# Patient Record
Sex: FEMALE | Race: ASIAN | NOT HISPANIC OR LATINO | Employment: FULL TIME | URBAN - METROPOLITAN AREA
[De-identification: names, ages, dates, MRNs, and addresses within clinical notes are randomized per-mention and may not be internally consistent; named-entity substitution may affect disease eponyms.]

---

## 2019-10-01 ENCOUNTER — PRENATAL OFFICE VISIT - HEALTHEAST (OUTPATIENT)
Dept: FAMILY MEDICINE | Facility: CLINIC | Age: 29
End: 2019-10-01

## 2019-10-01 DIAGNOSIS — Z32.00 POSSIBLE PREGNANCY: ICD-10-CM

## 2019-10-01 DIAGNOSIS — Z3A.10 10 WEEKS GESTATION OF PREGNANCY: ICD-10-CM

## 2019-10-01 DIAGNOSIS — Z32.01 PREGNANCY TEST POSITIVE: ICD-10-CM

## 2019-10-01 LAB
ALBUMIN UR-MCNC: NEGATIVE MG/DL
AMPHETAMINES UR QL SCN: NORMAL
BARBITURATES UR QL: NORMAL
BASOPHILS # BLD AUTO: 0 THOU/UL (ref 0–0.2)
BASOPHILS NFR BLD AUTO: 0 % (ref 0–2)
BENZODIAZ UR QL: NORMAL
CANNABINOIDS UR QL SCN: NORMAL
COCAINE UR QL: NORMAL
CREAT UR-MCNC: 126.3 MG/DL
EOSINOPHIL # BLD AUTO: 0.1 THOU/UL (ref 0–0.4)
EOSINOPHIL NFR BLD AUTO: 1 % (ref 0–6)
ERYTHROCYTE [DISTWIDTH] IN BLOOD BY AUTOMATED COUNT: 12.8 % (ref 11–14.5)
GLUCOSE UR STRIP-MCNC: NEGATIVE MG/DL
HCG UR QL: POSITIVE
HCT VFR BLD AUTO: 36.6 % (ref 35–47)
HGB BLD-MCNC: 11.7 G/DL (ref 12–16)
HIV 1+2 AB+HIV1 P24 AG SERPL QL IA: NEGATIVE
KETONES UR STRIP-MCNC: NEGATIVE MG/DL
LYMPHOCYTES # BLD AUTO: 2 THOU/UL (ref 0.8–4.4)
LYMPHOCYTES NFR BLD AUTO: 22 % (ref 20–40)
MCH RBC QN AUTO: 29 PG (ref 27–34)
MCHC RBC AUTO-ENTMCNC: 32 G/DL (ref 32–36)
MCV RBC AUTO: 91 FL (ref 80–100)
MONOCYTES # BLD AUTO: 0.5 THOU/UL (ref 0–0.9)
MONOCYTES NFR BLD AUTO: 5 % (ref 2–10)
NEUTROPHILS # BLD AUTO: 6.5 THOU/UL (ref 2–7.7)
NEUTROPHILS NFR BLD AUTO: 71 % (ref 50–70)
OPIATES UR QL SCN: NORMAL
OXYCODONE UR QL: NORMAL
PCP UR QL SCN: NORMAL
PLATELET # BLD AUTO: 248 THOU/UL (ref 140–440)
PMV BLD AUTO: 10.8 FL (ref 8.5–12.5)
RBC # BLD AUTO: 4.04 MILL/UL (ref 3.8–5.4)
WBC: 9.2 THOU/UL (ref 4–11)

## 2019-10-01 ASSESSMENT — MIFFLIN-ST. JEOR: SCORE: 1215.6

## 2019-10-02 LAB
ABO/RH(D): NORMAL
ABORH REPEAT: NORMAL
ANTIBODY SCREEN: NEGATIVE
BACTERIA SPEC CULT: NO GROWTH
COLLECTION METHOD: NORMAL
HBV SURFACE AG SERPL QL IA: NEGATIVE
LEAD BLD-MCNC: NORMAL UG/DL
RUBV IGG SERPL QL IA: POSITIVE
T PALLIDUM AB SER QL: NEGATIVE

## 2019-10-03 LAB — LEAD BLDV-MCNC: <2 UG/DL (ref 0–4.9)

## 2019-10-23 ENCOUNTER — PRENATAL OFFICE VISIT - HEALTHEAST (OUTPATIENT)
Dept: FAMILY MEDICINE | Facility: CLINIC | Age: 29
End: 2019-10-23

## 2019-10-28 ENCOUNTER — COMMUNICATION - HEALTHEAST (OUTPATIENT)
Dept: FAMILY MEDICINE | Facility: CLINIC | Age: 29
End: 2019-10-28

## 2019-11-08 ENCOUNTER — PRENATAL OFFICE VISIT - HEALTHEAST (OUTPATIENT)
Dept: FAMILY MEDICINE | Facility: CLINIC | Age: 29
End: 2019-11-08

## 2019-11-08 DIAGNOSIS — Z34.92 ENCOUNTER FOR SUPERVISION OF NORMAL PREGNANCY IN SECOND TRIMESTER: ICD-10-CM

## 2019-11-08 LAB
ALBUMIN UR-MCNC: NEGATIVE MG/DL
CLUE CELLS: NORMAL
GLUCOSE UR STRIP-MCNC: NEGATIVE MG/DL
KETONES UR STRIP-MCNC: NEGATIVE MG/DL
TRICHOMONAS, WET PREP: NORMAL
YEAST, WET PREP: NORMAL

## 2019-11-08 ASSESSMENT — MIFFLIN-ST. JEOR: SCORE: 1215.6

## 2019-11-11 LAB
C TRACH DNA SPEC QL PROBE+SIG AMP: NEGATIVE
HPV SOURCE: NORMAL
HUMAN PAPILLOMA VIRUS 16 DNA: NEGATIVE
HUMAN PAPILLOMA VIRUS 18 DNA: NEGATIVE
HUMAN PAPILLOMA VIRUS FINAL DIAGNOSIS: NORMAL
HUMAN PAPILLOMA VIRUS OTHER HR: NEGATIVE
N GONORRHOEA DNA SPEC QL NAA+PROBE: NEGATIVE
SPECIMEN DESCRIPTION: NORMAL

## 2019-11-25 ENCOUNTER — COMMUNICATION - HEALTHEAST (OUTPATIENT)
Dept: FAMILY MEDICINE | Facility: CLINIC | Age: 29
End: 2019-11-25

## 2019-12-04 ENCOUNTER — PRENATAL OFFICE VISIT - HEALTHEAST (OUTPATIENT)
Dept: FAMILY MEDICINE | Facility: CLINIC | Age: 29
End: 2019-12-04

## 2019-12-04 ENCOUNTER — COMMUNICATION - HEALTHEAST (OUTPATIENT)
Dept: FAMILY MEDICINE | Facility: CLINIC | Age: 29
End: 2019-12-04

## 2019-12-04 DIAGNOSIS — Z34.92 ENCOUNTER FOR SUPERVISION OF NORMAL PREGNANCY IN SECOND TRIMESTER: ICD-10-CM

## 2019-12-04 DIAGNOSIS — N89.8 VAGINAL DISCHARGE: ICD-10-CM

## 2019-12-04 ASSESSMENT — MIFFLIN-ST. JEOR: SCORE: 1224.22

## 2019-12-13 ENCOUNTER — HOSPITAL ENCOUNTER (OUTPATIENT)
Dept: ULTRASOUND IMAGING | Facility: HOSPITAL | Age: 29
Discharge: HOME | End: 2019-12-13
Attending: FAMILY MEDICINE

## 2019-12-16 ENCOUNTER — COMMUNICATION - HEALTHEAST (OUTPATIENT)
Dept: FAMILY MEDICINE | Facility: CLINIC | Age: 29
End: 2019-12-16

## 2019-12-31 ENCOUNTER — PRENATAL OFFICE VISIT - HEALTHEAST (OUTPATIENT)
Dept: FAMILY MEDICINE | Facility: CLINIC | Age: 29
End: 2019-12-31

## 2019-12-31 DIAGNOSIS — Z34.92 ENCOUNTER FOR SUPERVISION OF NORMAL PREGNANCY IN SECOND TRIMESTER: ICD-10-CM

## 2019-12-31 LAB
ALBUMIN UR-MCNC: NEGATIVE MG/DL
GLUCOSE UR STRIP-MCNC: NEGATIVE MG/DL
KETONES UR STRIP-MCNC: NEGATIVE MG/DL

## 2019-12-31 ASSESSMENT — MIFFLIN-ST. JEOR: SCORE: 1255.2

## 2020-01-28 ENCOUNTER — AMBULATORY - HEALTHEAST (OUTPATIENT)
Dept: FAMILY MEDICINE | Facility: CLINIC | Age: 30
End: 2020-01-28

## 2020-01-28 ENCOUNTER — PRENATAL OFFICE VISIT - HEALTHEAST (OUTPATIENT)
Dept: FAMILY MEDICINE | Facility: CLINIC | Age: 30
End: 2020-01-28

## 2020-01-28 ENCOUNTER — COMMUNICATION - HEALTHEAST (OUTPATIENT)
Dept: FAMILY MEDICINE | Facility: CLINIC | Age: 30
End: 2020-01-28

## 2020-01-28 DIAGNOSIS — Z34.92 ENCOUNTER FOR SUPERVISION OF NORMAL PREGNANCY IN SECOND TRIMESTER: ICD-10-CM

## 2020-01-28 DIAGNOSIS — O99.019 IRON DEFICIENCY ANEMIA DURING PREGNANCY: ICD-10-CM

## 2020-01-28 DIAGNOSIS — D50.9 IRON DEFICIENCY ANEMIA DURING PREGNANCY: ICD-10-CM

## 2020-01-28 LAB
ALBUMIN UR-MCNC: NEGATIVE MG/DL
FASTING STATUS PATIENT QL REPORTED: NO
GLUCOSE 1H P 50 G GLC PO SERPL-MCNC: 87 MG/DL (ref 70–139)
GLUCOSE UR STRIP-MCNC: NEGATIVE MG/DL
HGB BLD-MCNC: 10.7 G/DL (ref 12–16)
KETONES UR STRIP-MCNC: NEGATIVE MG/DL

## 2020-01-28 RX ORDER — FERROUS SULFATE 325(65) MG
1 TABLET ORAL
Qty: 90 TABLET | Refills: 3 | Status: SHIPPED | OUTPATIENT
Start: 2020-01-28

## 2020-01-29 LAB — T PALLIDUM AB SER QL: NEGATIVE

## 2020-03-16 ENCOUNTER — PRENATAL OFFICE VISIT - HEALTHEAST (OUTPATIENT)
Dept: FAMILY MEDICINE | Facility: CLINIC | Age: 30
End: 2020-03-16

## 2020-03-16 DIAGNOSIS — Z34.83 ENCOUNTER FOR SUPERVISION OF OTHER NORMAL PREGNANCY IN THIRD TRIMESTER: ICD-10-CM

## 2020-03-25 ENCOUNTER — COMMUNICATION - HEALTHEAST (OUTPATIENT)
Dept: FAMILY MEDICINE | Facility: CLINIC | Age: 30
End: 2020-03-25

## 2020-04-05 ENCOUNTER — COMMUNICATION - HEALTHEAST (OUTPATIENT)
Dept: FAMILY MEDICINE | Facility: CLINIC | Age: 30
End: 2020-04-05

## 2020-04-07 ENCOUNTER — COMMUNICATION - HEALTHEAST (OUTPATIENT)
Dept: NURSING | Facility: CLINIC | Age: 30
End: 2020-04-07

## 2020-04-07 ENCOUNTER — PRENATAL OFFICE VISIT - HEALTHEAST (OUTPATIENT)
Dept: FAMILY MEDICINE | Facility: CLINIC | Age: 30
End: 2020-04-07

## 2020-04-07 DIAGNOSIS — Z34.83 ENCOUNTER FOR SUPERVISION OF OTHER NORMAL PREGNANCY IN THIRD TRIMESTER: ICD-10-CM

## 2020-04-08 LAB
ALLERGIC TO PENICILLIN: NO
GP B STREP DNA SPEC QL NAA+PROBE: NEGATIVE

## 2020-04-17 ENCOUNTER — OFFICE VISIT - HEALTHEAST (OUTPATIENT)
Dept: FAMILY MEDICINE | Facility: CLINIC | Age: 30
End: 2020-04-17

## 2020-04-17 DIAGNOSIS — Z34.83 ENCOUNTER FOR SUPERVISION OF OTHER NORMAL PREGNANCY IN THIRD TRIMESTER: ICD-10-CM

## 2020-04-23 ENCOUNTER — HOME CARE/HOSPICE - HEALTHEAST (OUTPATIENT)
Dept: HOME HEALTH SERVICES | Facility: HOME HEALTH | Age: 30
End: 2020-04-23

## 2020-04-25 ENCOUNTER — HOME CARE/HOSPICE - HEALTHEAST (OUTPATIENT)
Dept: HOME HEALTH SERVICES | Facility: HOME HEALTH | Age: 30
End: 2020-04-25

## 2020-05-04 ENCOUNTER — COMMUNICATION - HEALTHEAST (OUTPATIENT)
Dept: FAMILY MEDICINE | Facility: CLINIC | Age: 30
End: 2020-05-04

## 2020-06-05 ENCOUNTER — OFFICE VISIT - HEALTHEAST (OUTPATIENT)
Dept: FAMILY MEDICINE | Facility: CLINIC | Age: 30
End: 2020-06-05

## 2020-06-05 ASSESSMENT — EDINBURGH POSTNATAL DEPRESSION SCALE (EPDS): TOTAL SCORE: 0

## 2020-08-11 ENCOUNTER — AMBULATORY - HEALTHEAST (OUTPATIENT)
Dept: LAB | Facility: CLINIC | Age: 30
End: 2020-08-11

## 2020-08-11 ENCOUNTER — AMBULATORY - HEALTHEAST (OUTPATIENT)
Dept: FAMILY MEDICINE | Facility: CLINIC | Age: 30
End: 2020-08-11

## 2020-08-11 ENCOUNTER — OFFICE VISIT - HEALTHEAST (OUTPATIENT)
Dept: FAMILY MEDICINE | Facility: CLINIC | Age: 30
End: 2020-08-11

## 2020-08-11 DIAGNOSIS — N30.00 ACUTE CYSTITIS WITHOUT HEMATURIA: ICD-10-CM

## 2020-08-11 DIAGNOSIS — R39.89 POSSIBLE URINARY TRACT INFECTION: ICD-10-CM

## 2020-08-11 LAB
BACTERIA #/AREA URNS HPF: ABNORMAL HPF
RBC #/AREA URNS AUTO: ABNORMAL HPF
SQUAMOUS #/AREA URNS AUTO: ABNORMAL LPF
WBC #/AREA URNS AUTO: ABNORMAL HPF

## 2020-08-11 RX ORDER — PHENAZOPYRIDINE HYDROCHLORIDE 200 MG/1
200 TABLET, FILM COATED ORAL 3 TIMES DAILY PRN
Qty: 9 TABLET | Refills: 0 | Status: SHIPPED | OUTPATIENT
Start: 2020-08-11 | End: 2022-01-17

## 2020-08-11 ASSESSMENT — PATIENT HEALTH QUESTIONNAIRE - PHQ9: SUM OF ALL RESPONSES TO PHQ QUESTIONS 1-9: 0

## 2020-08-12 LAB
BACTERIA SPEC CULT: ABNORMAL
BACTERIA SPEC CULT: ABNORMAL

## 2021-05-26 ASSESSMENT — PATIENT HEALTH QUESTIONNAIRE - PHQ9: SUM OF ALL RESPONSES TO PHQ QUESTIONS 1-9: 0

## 2021-05-27 VITALS
SYSTOLIC BLOOD PRESSURE: 94 MMHG | HEART RATE: 72 BPM | RESPIRATION RATE: 18 BRPM | TEMPERATURE: 98.2 F | DIASTOLIC BLOOD PRESSURE: 62 MMHG

## 2021-06-01 NOTE — PATIENT INSTRUCTIONS - HE
Pregnancy: 10 weeks    Due Date: April 24, 2020    Next visit in 3 weeks  With Dr. Nelson  For Your First OB Visit

## 2021-06-02 NOTE — TELEPHONE ENCOUNTER
Patient has a future appointment on November 8, 2019 @ 9:00am with Dr. Nelson for First OB. Completing task.

## 2021-06-02 NOTE — TELEPHONE ENCOUNTER
Called patient with  Abel ID# [id number], left a voice message to return the call. #1 Calling patient to reschedule her first OB appointment with Dr. Nelson. We can see patient on Monday 11/4/19 at 1:00 pm.

## 2021-06-03 VITALS
HEIGHT: 61 IN | HEART RATE: 68 BPM | BODY MASS INDEX: 23.5 KG/M2 | RESPIRATION RATE: 16 BRPM | SYSTOLIC BLOOD PRESSURE: 98 MMHG | DIASTOLIC BLOOD PRESSURE: 58 MMHG | WEIGHT: 124.5 LBS

## 2021-06-03 VITALS
WEIGHT: 124.5 LBS | HEART RATE: 76 BPM | HEIGHT: 61 IN | SYSTOLIC BLOOD PRESSURE: 90 MMHG | DIASTOLIC BLOOD PRESSURE: 56 MMHG | BODY MASS INDEX: 23.5 KG/M2

## 2021-06-03 NOTE — TELEPHONE ENCOUNTER
Patient Returning Call  Reason for call:  Patient and  calling back  Information relayed to patient:  Informed of normal lab and pap results, repeat pap in 5 years  Patient has additional questions:  No  If YES, what are your questions/concerns:  n/a  Okay to leave a detailed message?: No call back needed

## 2021-06-03 NOTE — TELEPHONE ENCOUNTER
----- Message from Nilsa Nelson DO sent at 11/24/2019 12:51 AM CST -----  Please call patient and inform:  Labs all normal. Pap smear is negative for any signs of cancer or abnormal cells. Next pap due 5 years.

## 2021-06-03 NOTE — PROGRESS NOTES
Please call patient and inform:  No signs of a yeast infection. Likely normal discharge of pregnancy.

## 2021-06-03 NOTE — PROGRESS NOTES
Please call patient and inform:  Labs all normal. Pap smear is negative for any signs of cancer or abnormal cells. Next pap due 5 years.

## 2021-06-03 NOTE — PATIENT INSTRUCTIONS - HE
"Patient Education   HEALTHY PREGNANCY CARE: 14 to 18 WEEKS PREGNANT    During this time, you may start to \"show,\" so that you look pregnant to people around you. You may also notice some changes in your skin, such as an increase in acne on your face. You may notice your heart pounding, a sharp stretching ache on either side of your lower abdomen (round ligament), and more vaginal discharge.     Your baby's nerves and muscles are maturing. Sex organs are recognizable. Your baby is now able to pass urine, and your baby's first stool (meconium) is starting to collect in his or her intestines. Hair is also beginning to grow on your baby's head. Your baby is moving freely inside your uterus but you may not be able to feel it until 18-20 weeks.    Continue making healthy choices for your baby during pregnancy, including good nutrition, exercise and a safe environment free from smoking, alcohol and drugs.    Your genetic screening with a quad screen blood test may have been done today.    Watch for warning signs and contact your midwife or physician at the clinic with any concerns at [facility]/OB at Phone: [phone]. For example, call about cramping, bleeding, abdominal pain, watery vaginal discharge, or if you are unable to keep fluids down for more than 24 hours due to vomiting.   If it's after clinic hours, physician patients should call the [facility] at [phone] ([phone]); midwife patients should call their answering service at [phone].    How can you care for yourself at home?   You can refer to the Starting Out Right book or find it online at [email or web address] or [email or web address]#[email or web address]     You can sign up for a weekly parenting e-mail that gives support, tips and advice from health care professionals that starts with " pregnancy and continues through the toddler years. To register, go to [email or web address] at any time during your pregnancy.

## 2021-06-03 NOTE — PATIENT INSTRUCTIONS - HE
"Patient Education   HEALTHY PREGNANCY CARE: 18-22 WEEKS PREGNANT    Your baby is continuing to develop quickly. At this stage, babies can now suck their thumbs,  firmly with their hands, and are beginning to hear.    Sometime between 18 and 22 weeks, you will start to feel your baby move. At first, these small fetal movements feel like fluttering or \"butterflies.\" Some women say that they feel like gas bubbles. As the baby grows, these movements will become stronger and be able to be felt through your abdomen.     Nutrition: During this time, you may find that your nausea and fatigue are gone. Overall, you may feel better and have more energy than you did in your first trimester. Be sure you are getting enough calcium and iron in your diet. Your prenatal vitamins cannot supply all of the nutrients you need, so continue to eat 3-4 servings of dairy foods and 2-3 servings of meat/fish/poultry/nuts every day. Foods high in iron include: red meats, eggs, dark green vegetables, dark yellow vegetables, nuts, kidney beans and chickpeas. Some cereals are fortified with iron, so look at the food labels for 100% of the daily requirement for iron.     [place] for childbirth and parenting classes, including an infant CPR class. Breastfeeding classes are recommended too.    Plan for the gestational diabetes screening between weeks 24-28. You can eat normally before that visit; we would suggest making sure you have protein foods, but not a lot of carbohydrates or sugary foods.    Your blood type was determined at your first OB appointment. If you are Rh negative, you should discuss the need for a Rhogam shot with your midwife or physician.     If you had a  birth in the past, discuss a trial of labor with your midwife or physician. He or she may ask that you obtain your operative report from that  if you are wanting to have a vaginal birth after  () this time.     Think about the support you have, " and what help you can plan on from family and friends, after your baby is born. Many mothers and babies are ready to go home from the hospital within a few days. Your clinic staff is available to assist you and the [facility] staff is available to you after hours. Start preparing your other children for changes they'll experience with the new baby. Explore day care options.    You may find that you have new discomforts now, such as sleep problems or leg cramps. To access information that can help you ease these discomforts, you can refer to the Starting Out Right book or find it online at [email or web address] or [email or web address]-[email or web address]-starting-[email or web address]#p=8    You can sign up for a weekly parenting e-mail that gives support, tips and advice from health care professionals that starts with pregnancy and continues through the toddler years. To register, go to [email or web address] at any time during your pregnancy.    Watch for the warning signs of premature labor:     Dull, low backache    Contractions of the uterus, menstrual-like cramps    Abdominal cramping with or without diarrhea    More pelvic pressure    Increase or change in vaginal discharge.     Remember that labor doesn't have to hurt. Never hesitate to call your midwife or physician or their staff at [facility]/OB at Phone: [phone] for any one of these warning signs - or if something just doesn't feel right. If it's after clinic hours, physician patients should call the [facility] at [phone] ([phone]); midwife patients should call their answering service at [phone].

## 2021-06-03 NOTE — PROGRESS NOTES
First OB   Feeling well.   Improved Nausea this week  Better Appetite  No pelvic pain  No other concerns. No ctx, lof, bleeding, or discharge.  No HA or vision changes.    Taking PNV: yes    Labs/imaging reviewed: dating by 7 wks, labs wnl  Discussed screening for aneuploidy and neural tube defects, SMA and CF.  Declines     Preformed physical exam   Reviewed TERRELL, past medical history, past surgical history, family history, genetic history, and previous obstetrical history.   Encouraged good nutrition, well balanced diet, regular activity.  Discussed foods to avoid.  And other applicable safety/health risks in pregnancy.    Neeraj was seen today for initial prenatal visit.    Diagnoses and all orders for this visit:    Encounter for supervision of normal pregnancy in second trimester  -     Urinalysis, OB Screen; Standing  -     Urinalysis, OB Screen  -     Gynecologic Cytology (PAP Smear)  -     Wet Prep, Vaginal  -     Chlamydia trachomatis & Neisseria gonorrhoeae, Amplified Detection      F/u in 4 weeks.     Nilsa Nelson

## 2021-06-03 NOTE — TELEPHONE ENCOUNTER
"Called patient with  Cookie ID# [id number], left a voice message for patient to return the call. # 1  \"Okay to relay message\"    "

## 2021-06-03 NOTE — TELEPHONE ENCOUNTER
----- Message from Nilsa Nelson DO sent at 12/4/2019 10:09 AM CST -----  Please call patient and inform:  No signs of a yeast infection. Likely normal discharge of pregnancy.

## 2021-06-03 NOTE — TELEPHONE ENCOUNTER
"Called patient with  Cookie ID# [id number], left a message with  to return the call. No consent to communicate on file. #1  \"Okay to relay message\"  "

## 2021-06-03 NOTE — PROGRESS NOTES
No ctx, lof, bleeding, or discharge.  No HA or vision changes.  Good FM : a little  Vaginal discharge: white, notices on underwear or with wiping. No itching or irritation. A little sometimes  Exam: small amt white vaginal discharge, more mucous like at the os. The nabothian cyst I thought I saw with pap smear is gone. Cervix looks normal today.     Neeraj was seen today for routine prenatal visit.    Diagnoses and all orders for this visit:    Encounter for supervision of normal pregnancy in second trimester  -     US OB > = 14 Weeks  -     Urinalysis, OB Screen  -     Urinalysis, OB Screen    Vaginal discharge  -     Wet Prep, Vaginal    anatomy US ordered  Handout given  F/u in 4 weeks.     Nilsa Nelson

## 2021-06-04 VITALS
HEART RATE: 75 BPM | WEIGHT: 140 LBS | DIASTOLIC BLOOD PRESSURE: 56 MMHG | BODY MASS INDEX: 26.45 KG/M2 | OXYGEN SATURATION: 100 % | SYSTOLIC BLOOD PRESSURE: 110 MMHG

## 2021-06-04 VITALS
HEART RATE: 72 BPM | RESPIRATION RATE: 16 BRPM | TEMPERATURE: 97.2 F | BODY MASS INDEX: 27.78 KG/M2 | SYSTOLIC BLOOD PRESSURE: 88 MMHG | DIASTOLIC BLOOD PRESSURE: 54 MMHG | WEIGHT: 147 LBS

## 2021-06-04 VITALS
DIASTOLIC BLOOD PRESSURE: 52 MMHG | SYSTOLIC BLOOD PRESSURE: 94 MMHG | RESPIRATION RATE: 14 BRPM | WEIGHT: 144 LBS | HEART RATE: 76 BPM | BODY MASS INDEX: 27.21 KG/M2

## 2021-06-04 VITALS
HEART RATE: 78 BPM | WEIGHT: 134 LBS | BODY MASS INDEX: 25.3 KG/M2 | DIASTOLIC BLOOD PRESSURE: 50 MMHG | SYSTOLIC BLOOD PRESSURE: 94 MMHG | RESPIRATION RATE: 14 BRPM | HEIGHT: 61 IN

## 2021-06-04 VITALS
DIASTOLIC BLOOD PRESSURE: 60 MMHG | WEIGHT: 126.4 LBS | BODY MASS INDEX: 23.86 KG/M2 | HEART RATE: 78 BPM | SYSTOLIC BLOOD PRESSURE: 90 MMHG | HEIGHT: 61 IN

## 2021-06-04 NOTE — TELEPHONE ENCOUNTER
"Called patient with  Cookie ID# [id number], left a voice message for patient to return the call. # 1  \"Okay to relay message\"    "

## 2021-06-04 NOTE — TELEPHONE ENCOUNTER
"Called patient with  Cookie ID# [id number], left a voice message for patient to return the call. # 2  \"Okay to relay message\"  "

## 2021-06-04 NOTE — TELEPHONE ENCOUNTER
----- Message from Nilsa Nelson DO sent at 12/13/2019  6:48 PM CST -----  Please call patient and inform:  Anatomy US normal. Baby growing well

## 2021-06-04 NOTE — TELEPHONE ENCOUNTER
Patient Returning Call  Reason for call:  Patient returning call to the clinic with .  Information relayed to patient:  Yes as instructed and patient agrees with plan.  Patient has additional questions:  No  If YES, what are your questions/concerns:  none  Okay to leave a detailed message?: Yes

## 2021-06-04 NOTE — PROGRESS NOTES
No ctx, lof, bleeding, or discharge.  No HA or vision changes.  Good FM : yes  Needs work note saying she cannot work overtime.  They want her working 7 days.  Can only do 5.    Labs/imaging reviewed: Its a girl!    Neeraj was seen today for routine prenatal visit.    Diagnoses and all orders for this visit:    Encounter for supervision of normal pregnancy in second trimester  -     Urinalysis, OB Screen    Plans to do breast and bottle.  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  Handout given  Follow-up in 4 weeks.  Labs and Tdap at next visit.    Nilsa Nelson

## 2021-06-05 NOTE — TELEPHONE ENCOUNTER
"Attempt to call pt, Voicemail box is full. Unable to leave message.#1  \" Okay to relay message\"      "

## 2021-06-05 NOTE — PROGRESS NOTES
Please call patient and inform:  You passed the blood sugar test. No signs of gestational diabetes.  Your hemoglobin level is 10.7.  A little bit low.  I am going to send you an iron tablet to your pharmacy.  Please take 1 every other day. Also Try to increase iron in your diet with meats, beans and leafy green vegetables.

## 2021-06-05 NOTE — TELEPHONE ENCOUNTER
Called and spoke with Neeraj Cid , Message was given, Neeraj Cid  understood, no further questions.

## 2021-06-05 NOTE — PROGRESS NOTES
No ctx, lof, bleeding, or discharge.  No HA or vision changes.  Good FM : yes  Work did not accept the letter I had written for her last time.  Needs a new letter that is more specific.  She does not want to work more than 40 hours a week because her feet and legs get tired pelvis gets tired.  Painful.    Neeraj was seen today for routine prenatal visit.    Diagnoses and all orders for this visit:    Encounter for supervision of normal pregnancy in second trimester  -     Glucose,Gestational Challenge (1 Hour)  -     Hemoglobin  -     Syphilis Screen, [facility]  -     Tdap vaccine greater than or equal to 6yo IM  -     Urinalysis, OB Screen  -     Urinalysis, OB Screen    New letter written  Handout given  F/u in 4 weeks.    Nilsa Nelson

## 2021-06-05 NOTE — TELEPHONE ENCOUNTER
----- Message from Nilsa Nelson DO sent at 1/28/2020  2:30 PM CST -----  Please call patient and inform:  You passed the blood sugar test. No signs of gestational diabetes.  Your hemoglobin level is 10.7.  A little bit low.  I am going to send you an iron tablet to your pharmacy.  Please take 1 every other day. Also Try to increase iron in your diet with meats, beans and leafy green vegetables.

## 2021-06-06 NOTE — PROGRESS NOTES
No ctx, lof, bleeding, or discharge.  No HA or vision changes.  Good FM : yes  Patient states she talked to her work about getting maternity leave.  They should be sending me her paperwork.  She is asking off a couple weeks before her due date. Starting April 13th to go through June 1st.  Will monitor for that paperwork.    Neeraj was seen today for routine prenatal visit.    Diagnoses and all orders for this visit:    Encounter for supervision of other normal pregnancy in third trimester  -     Urinalysis, OB Screen    Pain control: none  PPBC: none  Handout given  F/u in 2 weeks.  Discussed high risk group for COVID-19 virus and limiting our prenatal visits.  We will have her follow-up in 2 weeks at 36 weeks to do GBS swab and  1 final visit pending the current environment.  She states understanding agreeable to this plan.  We did discuss social isolation frequent handwashing.    Nilsa Nelson

## 2021-06-06 NOTE — PATIENT INSTRUCTIONS - HE
Patient Education   HEALTHY PREGNANCY CARE: 34-36 WEEKS PREGNANT    Your baby is gaining about an ounce each day, so healthy nutrition and rest are still very important. Learn about late pregnancy symptoms, such as constipation and backaches. Drinking more fluids and eating more fiber can relieve constipation. The pelvic tilt and a heating pad can ease backaches.    Continue to watch for signs and symptoms of preeclampsia:     Sudden swelling of your face, hands, or feet     New vision problems such as blurring, double vision, or flashing lights    A severe headache not relieved with acetaminophen (Tylenol)    Sharp or stabbing pain in your right or middle upper abdomen    You're almost done with your pregnancy but it's still too soon to have your baby. The goal is to have your baby after 37 weeks, so watch for signs and symptoms of premature labor:     Regular contractions. This means having about 6 or more within 1 hour, even after you have had a glass of water and are resting.     A backache that starts and stops regularly.    An increase or change in vaginal discharge, such as heavy, mucus-like, watery, or bloody discharge.     Your water breaks or leaks.    You will be tested for group B streptococcus (GBS), a type of bacteria found in 10-30% of pregnant women. A woman with GBS can pass it to her baby during delivery. Most babies who get GBS from their mothers do not have any problems, but some babies get very ill and need to be hospitalized for antibiotic treatment. Treating you with antibiotics during labor and delivery helps to prevent infection in your baby.    Review information on postpartum care that you will need after the baby is born.  Discuss your choices and plans for birth control with your midwife or physician.     Postpartum Care  During the days and weeks after the delivery of your baby (postpartum period), your body will change as it returns to its nonpregnant condition. As with pregnancy  "changes, postpartum changes are different for every woman.    Physical changes after childbirth  The changes in your body may include:    Contractions called afterpains shrink the uterus for several days after childbirth. Shrinking of the uterus to its prepregnancy size may take 6 to 8 weeks.    Sore muscles (especially in the arms, neck, or jaw) are common after childbirth. This is because of the hard work of labor and pushing your baby out. The soreness should go away in a few days.    Bleeding and vaginal discharge (lochia) may last for 2 to 8 weeks, and can come and go for about 2 months.    Vaginal soreness, including pain, discomfort, and numbness, is common after vaginal birth. Soreness may be worse if you had a perineal tear or episiotomy.    If you had a  birth (), you may have pain in your lower abdomen and may need pain medicine for 1 to 2 weeks.    Breast engorgement is common around the 3rd or 4th day after delivery, when the breasts begin to fill with milk. This can cause discomfort and swelling. If you are breast feeding, the best treatment is to feed your baby often or pump the milk out. You can also use warm compresses. If you are not breast feeding, placing ice packs on your breasts, taking a hot shower, or using warm compresses may relieve the discomfort.    Be aware of postpartum depression    \"Baby blues\" are common for the first 1 to 2 weeks after birth. You may cry or feel sad or irritable for no reason.     For some women, these feelings last longer and are more intense. This is called postpartum depression.     If your symptoms last for more than a few weeks or you feel very depressed, ask your midwife or physician for help.     Postpartum depression can be treated. Support groups and counseling can help, but sometimes medication is needed.     Discuss follow-up appointments with your midwife or physician, as well. He or she will usually want to see you 6 weeks after the " birth of your baby, sooner if you are having problems.    If you have questions about any symptoms you are experiencing or any other concerns, call your provider or their clinic staff at [facility] at Phone: [phone]. If it's after clinic hours, physician patients should call the [facility] at [phone] ([phone]); midwife patients should call their answering service at [phone].    How can you care for yourself at home?   You can refer to the Starting Out Right book or find it online at [email or web address]/[email or web address]://[email or web address]/[email or web address]/[email or web address]-Right.pdf or http://www.[facility].org/images/[email or web address]/flipbooks/[facility]-starting-out-[email or web address]-starting-out-right.html#p=8     You can sign up for a weekly parenting e-mail that gives support, tips and advice from health care professionals that starts with pregnancy and continues through the toddler years. To register, go to [email or web address] at any time during your pregnancy.    Making Early Breastfeeding or Chestfeeding Work: What's Important?  Breastfeeding/chestfeeding is important!     It helps keep babies healthy.    Parents who breastfeed/chestfeed have lower risks of breast and ovarian cancer.    It's convenient: the milk is always ready and warm, and there is nothing to mix or prepare for feeding.    Formula is harder for your baby to digest.    It helps you bond with your baby and protects against postpartum depression.  Lots of early skin-to-skin contact with your baby    Place your naked baby with baby's belly against your bare chest. Cover baby's back with a blanket    Start skin-to-skin right after birth, as soon as you are ready    Skin-to-skin:  ? Helps keep baby warm  ? Improves baby's oxygen and blood sugar levels  ? Helps your uterus contract and bleed less  ? Helps baby feel calm and comforted  ? Helps you feel close to baby  ? Helps get  "breastfeeding started. Being close makes latching on easier, and baby may move over to the nipple and latch without help  ? Baby breastfeeds better and longer when skin-to-skin  Placing baby well for good attachment to the breast or chest    Hold your baby close with baby's tummy touching your tummy.    Wait for baby to open mouth wide, then bring baby onto the breast/chest.    Baby should take a big mouthful of breast/chest, not just the nipple. This helps baby get more milk, and the suckling should feel comfortable.    When baby is latched well to the breast/chest, nipples aren't cracked or painful.  Keeping baby near (called \"rooming-in\" at the hospital)    Baby sleeps better and cries less when birth parent is near. Your room is quiet.    We will place your baby safely on their back in their bassinette. This lets you practice safe sleep for your baby while keeping them at your bedside.    Baby feeds more often, which means your milk supply increases faster, and your baby loses less weight.    Parents have an easier time getting to know and bonding with baby.    Parents feel much more confident about baby care and breastfeeding/chestfeeding.    Maternity staff can help at any time.  Feeding on cue    Feeding on cue simply means feeding whenever your baby shows signs of hunger    Crying is a late hunger sign. Feed baby whenever baby wants for as long as baby wants.    Feeding signs: mouth movements, sticking the tongue out, rooting (baby turns toward chest and may open mouth), hand-to-mouth movements    Advantages of feeding on cue:  ? Frequent breastfeeding/chestfeeding in the first weeks after birth gives you a good milk supply for months to come.  ? Babies settle into a relaxing feed more quickly. Babies enjoy feedings more when they don't have to cry to be fed.  ? Feeding is comfort as well as nutrition. Newborns love constant closeness and feeding and can't be held \"too much\" or \"spoiled.\"  ? Newborns need " "small frequent feedings in the first days of life. Just one to three teaspoons fill a new baby's stomach.  ? Responding to feeding cues helps babies gain weight.  Feeding only human milk in the first six months    Colostrum is the first milk that baby gets at birth. The amount of colostrum matches the baby's stomach, so it will not be overfilled.    The small volumes ready at birth are also easier for baby to handle.    All babies lose weight in the first few days. This is simply \"water weight.\"    Introducing food or fluids other than human milk too early can cause problems for breastfeeding/chestfeeding and for your baby's health.    Feeding only human milk maximizes the protection against disease and infections.    Your body knows how much milk to make by how often your baby feeds. If you give your baby formula, your body may not know how much milk to make.    For informational purposes only. Not to replace the advice of your health care provider. Adapted with permission from \"Getting Started with Infant Care and Breastfeeding,\" by CHRISTOPHER Jorgensen, LEONORA, Jada Juarez, RN, IBCLC, Nilsa Narayan MD, LEONORA, and Nidia Hernandez MD, IBCLC. [place] [employer], 2017. Clinically reviewed by [facility]. [employer] 203101 - 05/19.        [facility] Breastfeeding Resources       Research shows that human milk offers the best  nutrition and protection for babies. So at [facility],  we care for families and babies in a way that  promotes, teaches and supports lactation. We  support all breastfeeding, chestfeeding and human  milk feeding families, as well as families who can t  breastfeed / chestfeed or who choose not to do so.  A mother or caregiver s own milk is best for a baby,  but if you are unable to breastfeed / chestfeed, we  may suggest pasteurized donor human milk for your  baby while in the hospital.    Lactation support    The following [facility]-affiliated locations offer  individual " outpatient lactation consults. Some  locations offer phone or group lactation consults  with certified lactation consultants. Call to confirm  services and for information and appointments. You  may wish to call your insurance company first to see  if they will cover the cost of a consult.    [facility]: [phone]    [facility]: [phone]    [facility]: [phone]  Offers [place] First Days program, which includes  group lactation visits and one free information session  about delivering your baby at a designated [facility] hospital and the importance and benefits  of breastfeeding and human milk. These group visits  also help patients with feeding concerns and offer  information about other postpartum topics.                For informational purposes only. Not to replace the advice of your health care  provider. Copyright   2005 [facility]. All rights reserved. [employer] 043149 - REV .   [facility] ([place]):  [phone]    [facility]):  [phone] or [phone]    [facility]):  [phone]    [facility]  ([place]): [phone]    [facility] ([place]):  [phone] or [phone]  Includes follow-up visits for caregivers of babies  discharged from the  intensive care unit  (NICU) at [facility].    [facility]):  [phone]    [facility] ([facility],  [facility], primary care clinics):  [phone]  Also offers weight checks, feeding discussions and support with a lactation consultant as a part of free, weekly support group.    [facility]: [phone]  Also offers a free weekly support group.                                                                                                                                                                                                       (continued)   You may also call [facility] at [phone]  for information about [facility] and [facility]  locations that offer lactation support. For information  about breastfeeding / chestfeeding and childbirth  classes in the [facility], go to [facility] at www.[employer]. For [facility], go to [email or web address] and click on  Classes and Events at the bottom of the page. Or, call  [phone].    Supplies    You can get breast pumps from the Birth[facility] nurses  at [facility] or [facility]  nurses at [facility]. Call your health  insurance to see if they will cover the cost of the  pump. Tell your nurse you d like a pump. They will  help you fill out the right paperwork. The pump will  be ready for you when you leave the hospital.    If you decide to get a pump after you leave the  hospital, you can get one from our partners at  [facility]. [facility] carries a range of  feeding supplies and pumps. Please call your health  insurance to see if they will cover the cost of the  pump. Then call [facility]  to find out what supplies and pumps are available.  Some stores may deliver the pump to your home.    [facility]:  [email or web address] Other lactation services    Sharmin Karimi: [phone] (24 hours a day)  www.[email or web address]  Offers support for breastfeeding / chestfeeding and  human milk feeding families. Call to find a group in  your area.    [facility]:  [phone]  www.womenshealth.gov/breastfeeding  Offers a breastfeeding / chestfeeding information  line in English and Ecuadorean.    WIC (Women, Infants and Children) Program:  [phone]  Offers breastfeeding / chestfeeding counseling. Call  to find an office near  you.    Milk banking    [facility]  [email or web address]  [phone]  Consider freezing your extra collected milk to donate  to babies in need. Email or call for information.                           If you are deaf or hard of hearing, please let us know. We provide many free services including  sign language interpreters, oral interpreters, TTYs, telephone amplifiers, note takers and written materials.

## 2021-06-07 NOTE — PATIENT INSTRUCTIONS - HE
Patient Education   HEALTHY PREGNANCY CARE: 37 to 41 WEEKS PREGNANT    Talk with your midwife or physician about when to call with signs of labor    Regular uterine contractions that are getting closer together and/or stronger    If you think your water has broken or is leaking    Bleeding from the vagina like a period (bloody vaginal discharge is normal)    If you are not feeling your baby move    Make plans for transportation and  as needed for when you are going to the hospital.    Your midwife or physician may offer to check your cervix for changes.     Ask your health care provider about vaccinations you may need following delivery. By now, you should have received a Tdap immunization to protect against pertussis or whooping cough. Fathers and family members who will be in close contact with the baby should also receive a Tdap shot at least two weeks before the expected birth of the baby if they have not had a Td (tetanus) shot for at least two years.    If you are past your due date, discuss the next steps leading to delivery with your midwife or physician. If you don't start labor on your own by 41 or 42 weeks, your midwife or physician may recommend giving you medicines to ripen your cervix and start labor.    Preparing for your baby: Tell your midwife or physician how you plan to feed your baby (breast or bottle), who you have chosen to do pediatric care for your baby, and if you have a boy, whether you have chosen to have him circumcised. You will need a car seat correctly installed in your vehicle to bring your baby home. As you start to set up the nursery at home for your baby, make sure the crib is safe. The mattress needs to fit snugly against the edges of the crib. If you can fit a soda can between the bars, they are too far apart and can allow the baby's head to caught between them.    Learn about infant care and feeding, including information about infant CPR. We recommend that you put  your baby to sleep on his or her back to reduce the chance of Sudden Infant Death Syndrome (SIDS). To maintain a healthy environment in which your child can grow, it's best to keep your home smoke-free. By preparing ahead, your transition into parenthood will go smoothly for you and your baby.    Your midwife or physician will want to see you for a checkup 2 to 6 weeks after delivery.    If you have questions about any symptoms you are experiencing or any other concerns, call your provider or their clinic staff at [facility]ILY [facility]/OB at Phone: [phone]. If it's after clinic hours, physician patients should call the Care Connection at [phone] ([phone]); midwife patients should call their answering service at [phone].    How can you care for yourself at home?   You can refer to the Starting Out Right book or find it online at [email or web address]/images/stories/[email or web address]-Starting-Out-Right.pdf or [email or web address]/images/stories/flipbooks/[facility]-starting-out-[email or web address]-starting-out-right.html#p=8     You can sign up for a weekly parenting e-mail that gives support, tips and advice from health care professionals that starts with pregnancy and continues through the toddler years. To register, go to [email or web address] at any time during your pregnancy.    Making Early Breastfeeding or Chestfeeding Work: What's Important?  Breastfeeding/chestfeeding is important!     It helps keep babies healthy.    Parents who breastfeed/chestfeed have lower risks of breast and ovarian cancer.    It's convenient: the milk is always ready and warm, and there is nothing to mix or prepare for feeding.    Formula is harder for your baby to digest.    It helps you bond with your baby and protects against postpartum depression.  Lots of early skin-to-skin contact with your baby    Place your naked baby with baby's belly against your bare chest. Cover baby's back with a  "blanket    Start skin-to-skin right after birth, as soon as you are ready    Skin-to-skin:  ? Helps keep baby warm  ? Improves baby's oxygen and blood sugar levels  ? Helps your uterus contract and bleed less  ? Helps baby feel calm and comforted  ? Helps you feel close to baby  ? Helps get breastfeeding started. Being close makes latching on easier, and baby may move over to the nipple and latch without help  ? Baby breastfeeds better and longer when skin-to-skin  Placing baby well for good attachment to the breast or chest    Hold your baby close with baby's tummy touching your tummy.    Wait for baby to open mouth wide, then bring baby onto the breast/chest.    Baby should take a big mouthful of breast/chest, not just the nipple. This helps baby get more milk, and the suckling should feel comfortable.    When baby is latched well to the breast/chest, nipples aren't cracked or painful.  Keeping baby near (called \"rooming-in\" at the hospital)    Baby sleeps better and cries less when birth parent is near. Your room is quiet.    We will place your baby safely on their back in their bassinette. This lets you practice safe sleep for your baby while keeping them at your bedside.    Baby feeds more often, which means your milk supply increases faster, and your baby loses less weight.    Parents have an easier time getting to know and bonding with baby.    Parents feel much more confident about baby care and breastfeeding/chestfeeding.    Maternity staff can help at any time.  Feeding on cue    Feeding on cue simply means feeding whenever your baby shows signs of hunger    Crying is a late hunger sign. Feed baby whenever baby wants for as long as baby wants.    Feeding signs: mouth movements, sticking the tongue out, rooting (baby turns toward chest and may open mouth), hand-to-mouth movements    Advantages of feeding on cue:  ? Frequent breastfeeding/chestfeeding in the first weeks after birth gives you a good milk " "supply for months to come.  ? Babies settle into a relaxing feed more quickly. Babies enjoy feedings more when they don't have to cry to be fed.  ? Feeding is comfort as well as nutrition. Newborns love constant closeness and feeding and can't be held \"too much\" or \"spoiled.\"  ? Newborns need small frequent feedings in the first days of life. Just one to three teaspoons fill a new baby's stomach.  ? Responding to feeding cues helps babies gain weight.  Feeding only human milk in the first six months    Colostrum is the first milk that baby gets at birth. The amount of colostrum matches the baby's stomach, so it will not be overfilled.    The small volumes ready at birth are also easier for baby to handle.    All babies lose weight in the first few days. This is simply \"water weight.\"    Introducing food or fluids other than human milk too early can cause problems for breastfeeding/chestfeeding and for your baby's health.    Feeding only human milk maximizes the protection against disease and infections.    Your body knows how much milk to make by how often your baby feeds. If you give your baby formula, your body may not know how much milk to make.    For informational purposes only. Not to replace the advice of your health care provider. Adapted with permission from \"Getting Started with Infant Care and Breastfeeding,\" by CHRISTOPHER Jorgensen, LEONORA, Jada Juarez, RN, IBCLC, Nilsa Narayan MD, LEONORA, and Nidia Hernandez MD, IBCLC. [place] [employer], 2017. Clinically reviewed by [facility]. [employer] 369181 - 05/19.        [facility] Breastfeeding Resources     Research shows that human milk offers the best  nutrition and protection for babies. So at [facility],  we care for families and babies in a way that  promotes, teaches and supports lactation. We  support all breastfeeding, chestfeeding and human  milk feeding families, as well as families who can t  breastfeed / chestfeed or who " choose not to do so.  A mother or caregiver s own milk is best for a baby,  but if you are unable to breastfeed / chestfeed, we  may suggest pasteurized donor human milk for your  baby while in the hospital.    Lactation support    The following [facility] locations offer  individual outpatient lactation consults. Some  locations offer phone or group lactation consults  with certified lactation consultants. Call to confirm  services and for information and appointments. You  may wish to call your insurance company first to see  if they will cover the cost of a consult.    [facility]: [phone]    [facility]: [phone]    [facility]: [phone]  Offers [facility] First Days program, which includes  group lactation visits and one free information session  about delivering your baby at a designated [facility] hospital and the importance and benefits  of breastfeeding and human milk. These group visits  also help patients with feeding concerns and offer  information about other postpartum topics.                For informational purposes only. Not to replace the advice of your health care provider. Copyright   2005 [facility]. All rights reserved. [employer] 715037 - REV          [facility] ([place]):  [phone]    [facility] ([place]):  [phone] or [phone]    [facility]):  [phone]    [facility]  ([place]): [phone]    [facility] ([place]):  [phone] or [phone]  Includes follow-up visits for caregivers of babies  discharged from the  intensive care unit  (NICU) at [facility].    [facility]):  [phone]    [facility] ([facility],  [facility], primary care clinics):  [phone]  Also offers  weight checks, feeding discussions and support with a lactation consultant as a part of free, weekly support group.    [facility]: [phone]  Also offers a free weekly support group.                                                                                                                                                                                                      (continued)   You may also call [facility] at [phone]  for information about [facility] and [facility]  locations that offer lactation support. For information  about breastfeeding / chestfeeding and childbirth  classes in the [facility], go to [facility] at www.[employer]. For [facility], go to [email or web address] and click on  Classes and Events at the bottom of the page. Or, call  [phone].    Supplies    You can get breast pumps from the Birthplace nurses  at [facility] or [facility]  nurses at [facility]. Call your health  insurance to see if they will cover the cost of the  pump. Tell your nurse you d like a pump. They will  help you fill out the right paperwork. The pump will  be ready for you when you leave the hospital.    If you decide to get a pump after you leave the  hospital, you can get one from our partners at  [facility]. [facility] carries a range of  feeding supplies and pumps. Please call your health  insurance to see if they will cover the cost of the  pump. Then call [facility]  to find out what supplies and pumps are available.  Some stores may deliver the pump to your home.    [facility]:  [email or web address] Other lactation services    [facility]: [phone] (24 hours a day)  www.[email or web address]  Offers support for breastfeeding / chestfeeding and  human milk feeding families. Call to find a group in  your area.    National Women s Health  Information Center:  [phone]  www.womenshealth.gov/breastfeeding  Offers a breastfeeding / chestfeeding information  line in English and Japanese.    WIC (Women, Infants and Children) Program:  [phone]  Offers breastfeeding / chestfeeding counseling. Call  to find an office near you.    Milk banking    [facility]  [email or web address]  [phone]  Consider freezing your extra collected milk to donate  to babies in need. Email or call for information.                       If you are deaf or hard of hearing, please let us know. We provide many free services including  sign language interpreters, oral interpreters, TTYs, telephone amplifiers, note takers and written materials.

## 2021-06-07 NOTE — PROGRESS NOTES
"Clinic Care Coordination Contact  Reason of call: [id number] - Ambulatory referral to Care Management (Primary Care).   Note attached:   \"Note     Expecting baby soon- has car seat but no safe bed for baby yet.\"        Clinical Data: Care Coordinator Outreach  Outreach attempted x 1.  Left message on patient's voicemail with call back information and requested return call.  Plan: Care Coordinator will try to reach patient again in 1-2 business days.        "

## 2021-06-07 NOTE — PROGRESS NOTES
on the phone ID #[id number]  Reviewed plan for pregnancy with covid-19.  Pt already on iron and pnv.  Started maternity leave already so just staying home now.   on SSDI so not working either.  Wearing a mask when out of the home.  No need for food.  Has car seat but needs crib for baby.  Oldest child had mild jaundice but no need for treatment- sun only.

## 2021-06-07 NOTE — TELEPHONE ENCOUNTER
Called patient regarding message. Unable to reach patient. Unable to leave message, mailbox is full.

## 2021-06-07 NOTE — TELEPHONE ENCOUNTER
Who is requesting the letter? Patient  Why is the letter needed? Needs letter for work to not return to work until baby is born due to concern about possibly getting exposed to COVID-19 from co-workers- due date is 04/24, patient would like to start leave of absence now.  How would you like this letter returned? Patient states she gave employer contact information to Dr Nelson previously, however, if unable to find fax number-Patient would be willing to  at clinic if able to  Okay to leave a detailed message? Yes    Screened for Covid-19 No symptoms for past 14 days and no questions or concerns regarding covid-19.

## 2021-06-07 NOTE — TELEPHONE ENCOUNTER
Travel questionnaire was asked. Verified that they have no signs of COVID-19 symptoms.    spouse dropped off leave of absence for Dr. Nelson to fill out. Placed the original copies in the 's slot.    When forms are completed, patient would like it:    -Please call patient to let them know it's ready      Please re-route task back to the  to shred the copied forms and complete the task. Thanks!

## 2021-06-07 NOTE — PROGRESS NOTES
"Neeraj Cid is a 30 y.o. female who is being evaluated via a billable telephone visit.      The patient has been notified of following:     \"This telephone visit will be conducted via a call between you and your physician/provider. We have found that certain health care needs can be provided without the need for a physical exam.  This service lets us provide the care you need with a short phone conversation.  If a prescription is necessary we can send it directly to your pharmacy.  If lab work is needed we can place an order for that and you can then stop by our lab to have the test done at a later time.    Telephone visits are billed at different rates depending on your insurance coverage. During this emergency period, for some insurers they may be billed the same as an in-person visit.  Please reach out to your insurance provider with any questions.    If during the course of the call the physician/provider feels a telephone visit is not appropriate, you will not be charged for this service.\"    Patient has given verbal consent to a Telephone visit? Yes    Patient would like to receive their AVS by AVS Preference: Mail a copy.    Additional provider notes:   Assessment/Plan:  1. Encounter for supervision of other normal pregnancy in third trimester  No red flags.  Education reviewed.      Return in about 1 week (around 2020) for Recheck.    Patient Education/AVS:  There are no Patient Instructions on file for this visit.    Chief Complaint:  Chief Complaint   Patient presents with     Routine Prenatal Visit     39w       HPI: [place]  on phone  Neeraj Cid is a 30 y.o. female c/o 39 week ob visit .  Currently no one working outside of the home.  Pregnancy doing well- no concerns.  Regular fetal movement.  No bleeding or LOF.  Mild d/c wnl for her pregnancy.  Mild tightness/cramping but not daily.  No headaches or swelling.       Lab tests reviewed-1: GBS neg reviewed with patient    Social " History     Tobacco Use   Smoking Status Never Smoker   Smokeless Tobacco Never Used     Social History     Substance and Sexual Activity   Sexual Activity Yes     Partners: Male     Social History     Social History Narrative    Lives with  (Sosa), in-laws and 2 children       Physical Exam:  Vitals from last visit reviewed.   Per pt report at home:  LMP 07/02/2019 (Approximate)  Patient's last menstrual period was 07/02/2019 (approximate).  Wt Readings from Last 3 Encounters:   04/07/20 147 lb (66.7 kg)   03/16/20 144 lb (65.3 kg)   01/28/20 140 lb (63.5 kg)       No data recorded  No data recorded  PHQ-2 Total Score: 0 (10/1/2019  9:04 AM)    No data recorded    GENERAL: Patient sounds well and able to participate in history and planning without difficulty.     Phone call duration:  7 minutes    Vicky Sosa MD

## 2021-06-07 NOTE — PROGRESS NOTES
Clinic Care Coordination Contact  [facility]/Voicemail    Clinical Data: Care Coordinator Outreach  Outreach attempted x 2.  Left message on patient's voicemail with call back information and requested return call if interested to discuss services. PCP office and [facility] phone numbers provide.   Plan: Care Coordinator will send care coordination introduction letter with care coordinator contact information and explanation of care coordination services via mail. Care Coordinator will do no further outreaches at this time.

## 2021-06-07 NOTE — TELEPHONE ENCOUNTER
Spoke with patient Neeraj Cid, unable to obtain fax number from the employer phone number that she provided. She requested this to be mailed to her. Letter obtained drogianluca Mahmood and mailed to patient.

## 2021-06-07 NOTE — TELEPHONE ENCOUNTER
Attempted to call patient this evening and left a message with [facility] interpretor to explain my furlough starting tomorrow.     Dr. Sosa- if you could let her know at her appt on 4/7 I would appreciate it. THank you :)

## 2021-06-08 NOTE — PROGRESS NOTES
"Neeraj Cid is a 30 y.o. female who is being evaluated via a billable telephone visit.      The patient has been notified of following:     \"This telephone visit will be conducted via a call between you and your physician/provider. We have found that certain health care needs can be provided without the need for a physical exam.  This service lets us provide the care you need with a short phone conversation.  If a prescription is necessary we can send it directly to your pharmacy.  If lab work is needed we can place an order for that and you can then stop by our lab to have the test done at a later time.    Telephone visits are billed at different rates depending on your insurance coverage. During this emergency period, for some insurers they may be billed the same as an in-person visit.  Please reach out to your insurance provider with any questions.    If during the course of the call the physician/provider feels a telephone visit is not appropriate, you will not be charged for this service.\"    Patient has given verbal consent to a Telephone visit? Yes    What phone number would you like to be contacted at? [phone]    Patient would like to receive their AVS by AVS Preference: Mail a copy.    Additional provider notes:   Post Partum Check:  Delivery at 39w5d now .  Date of delivery: 20    Patient concerns: none. Breast x 1 month. Last 3 weeks no breast, infant having watery stools (discussed).     Bleeding: no concerns    Pain: no concerns    LMP:Patient's last menstrual period was 2019 (approximate).     Depression: no concerns  Postpartum Depression Screen EPDS Total Score: 0 (2020 10:00 AM)  .  Item 10 (suicidal thoughts): Negative.  Interpretation Guide: Possible Depression = 10 or greater.       Contraception Plan:  none    Immunizations needed:  none    Pap smear:  uptodate      Assessment and Plan     30 y.o.  here today for postpartum check.  1. Discussed ideal body weight. "   2. Return to work: she is back to work, going well  3. Pap smear up to date.  4. Contraception Plan: none.  5. Postpartum Evaluation of Pregnancy/Chronic Health Conditions: none.  6. Referrals: none.  7. Next physical exam due in one year.      Phone call duration:  11 minutes

## 2021-06-08 NOTE — TELEPHONE ENCOUNTER
Called and left a details message of completing her [employer] form. Form is complete and at the . Completing task.

## 2021-06-10 NOTE — PROGRESS NOTES
"Neeraj Cid is a 30 y.o. female who is being evaluated via a billable telephone visit.      The patient has been notified of following:     \"This telephone visit will be conducted via a call between you and your physician/provider. We have found that certain health care needs can be provided without the need for a physical exam.  This service lets us provide the care you need with a short phone conversation.  If a prescription is necessary we can send it directly to your pharmacy.  If lab work is needed we can place an order for that and you can then stop by our lab to have the test done at a later time.    Telephone visits are billed at different rates depending on your insurance coverage. During this emergency period, for some insurers they may be billed the same as an in-person visit.  Please reach out to your insurance provider with any questions.    If during the course of the call the physician/provider feels a telephone visit is not appropriate, you will not be charged for this service.\"    Patient has given verbal consent to a Telephone visit? Yes    What phone number would you like to be contacted at? [phone]    Patient would like to receive their AVS by AVS Preference: Mail a copy.    ____________________________    Virtual Visit - Telephone Encounter  [facility]  Family Medicine  Date of Service: 8/11/2020    Subjective:    Painful urination  Started yesterday  Small amount, often  Really hurts to get even a few drips out  No back pain  No fever  No prior history of urinary problems/stones  Not breast feeding    Objective:  unknown if currently breastfeeding.   Speech is normal  Patient is calm  Lab on 08/11/2020   Component Date Value Ref Range Status     Bacteria, UA 08/11/2020 Few* None Seen hpf Final     RBC, UA 08/11/2020 0-2  None Seen, 0-2 hpf Final     WBC, UA 08/11/2020 0-5  None Seen, 0-5 hpf Final     Squam Epithel, UA 08/11/2020 10-25* None Seen, 0-5 lpf Final "     No results found.   Assessment & Plan:  1. Acute cystitis. Treat with ciprofloxacin 250 mg two times a day for 3 days. Pyridium prn for dysuria. Does not wear contacts. Added on urine culture.      Order Summary                                                      1. Acute cystitis without hematuria  ciprofloxacin HCl (CIPRO) 250 MG tablet    phenazopyridine (PYRIDIUM) 200 MG tablet      No future appointments.    Completed by: Trista Freed M.D., [facility]. 8/11/2020 1:51 PM.  This transcription uses voice recognition software, which may contain typographical errors.  ____________________________  Start call: 1:51 PM   End call: 2:07 PM      Phone call duration:  16 minutes

## 2021-06-17 NOTE — PATIENT INSTRUCTIONS - HE
Patient Instructions by Nilsa Nelson DO at 12/31/2019  9:20 AM     Author: Nilsa Nelson DO Service: -- Author Type: Physician    Filed: 12/31/2019  9:45 AM Encounter Date: 12/31/2019 Status: Signed    : Nilsa Nelson DO (Physician)       Patient Education   HEALTHY PREGNANCY CARE: 22-26 WEEKS PREGNANT    You are finishing your second trimester. Your baby is developing rapidly. At this stage, babies have a sense of balance, can respond to touch, and are recognizing parent voices.  Your baby will be moving around more now.  You may notice Elliott-Adhikari contractions now, which are painless and prepare the uterus for the delivery.    Nutrition: During this time, you may find that your nausea and fatigue are gone. Overall, you may feel better and have more energy than you did in your first trimester. Be sure you are getting enough calcium and iron in your diet. Your prenatal vitamins cannot supply all of the nutrients you need, so continue to eat 3-4 servings of dairy foods and 2-3 servings of meat/fish/poultry/nuts every day. Foods high in iron include: red meats, eggs, dark green vegetables, dark yellow vegetables, nuts, kidney beans and chickpeas. Some cereals are fortified with iron, so look at the food labels for 100% of the daily requirement for iron.     Discuss your work situation with your midwife or physician as needed. If you stand for long periods of time, you may need to make changes and take breaks.    [place] for childbirth and parenting classes, including an infant CPR class. Breastfeeding classes are recommended too.    Plan for the gestational diabetes screening between weeks 24-28. You can eat normally before that visit; we would suggest making sure you have protein foods, but not a lot of carbohydrates or sugary foods.    Your blood type was determined at your first OB appointment. If you are Rh negative, you should discuss the need for a Rhogam shot with your midwife or physician. This would  "be administered around 28 weeks if necessary.    How can you care for yourself at home?   You can refer to the Starting Out Right book or find it online at [email or web address] or [email or web address]     You can sign up for a weekly parenting e-mail that gives support, tips and advice from health care professionals that starts with pregnancy and continues through the toddler years. To register, go to [email or web address] at any time during your pregnancy.    Watch for the warning signs of premature labor:   \" Dull, low backache  \" Contractions of the uterus, menstrual-like cramps  \" Abdominal cramping with or without diarrhea  \" More pelvic pressure  \" Increase or change in vaginal discharge.     Continue to watch for signs and symptoms of preeclampsia:   \" Sudden swelling of your face, hands, or feet   \" New vision problems such as blurring, double vision, or flashing lights  \" A severe headache not relieved with acetaminophen (Tylenol)  \" Sharp or stabbing pain in your right or middle upper abdomen        Remember that labor doesn't have to hurt. Never hesitate to call your midwife or physician or their staff at [facility]/OB at Phone: [phone] for any one of these warning signs - or if something just doesn't feel right. If it's after clinic hours, physician patients should call the [facility] at [phone] ([phone]); midwife patients should call their answering service at [phone].    BREASTFEEDING TIPS FOR NEW MOMS     Importance of skin-to-skin contact:  ? Gets breastfeeding off to a good start  ? Keeps baby warm and is great for bonding  ? Provides calming effects and helps to stabilize breathing and  blood sugar  ? Helps the uterus to contract and bleed less    Importance of feeding whenever baby shows signs " "of  being hungry, \"feeding on cue\":  ? Helps create a good milk supply appropriate to the babys needs  ? Less breastfeeding complications such as engorgement or  low supply  ? Helps baby get enough milk  ? Milk supply is determined by how often baby nurses and empties  the breast.  ? Feeding is for comfort as well as nutrition    Importance of good latch (positioning and attaching  baby properly at breast):  ? Helps prevent sore nipples  ? Helps ensure baby gets enough milk and supports moms breast  milk supply    Risks of giving baby supplements other  than moms breastmilk  Breastfeeding alone is recommended for the first 6 months, if not it:  ? Can make baby more prone to illness  ? Can make baby less satisfied at breast (wanting larger amounts or  faster flow)  ? Reduces milk supply    Importance of rooming-in:  ? Increases parent confidence while mother is supported by the  hospital staff  ? Caregivers learn how to care for baby and recognize feeding cues  ? Enables feeding whenever baby needs to eat  ? Baby is comforted with mom and learns to recognize caregivers    Avoiding use of pacifiers:  ? Use of pacifiers in the first weeks can make it difficult to make a full  milk supply  ? May interfere with baby learning to latch well      2017 [facility] 498233. SW 119271. DOD 11.17         Breastfeeding   Why Its Important and What to Expect     Your breast milk is the best food for your baby--and  breastfeeding can help you be healthy as well.    Though breastfeeding is natural, it is a learned process for both mother and baby. To prepare, there are things you can learn--and do--before your baby is born.    ? Learn the benefits of breastfeeding.    ? Understand the basic process.    ? Know what to expect in the hospital.    ? Arrange breastfeeding support for the first few  weeks after birth.    ? Take a breastfeeding class (see back page).    ? Talk to your midwife, nurse or doctor if you have  Questions.    The " benefits of breastfeeding    Human milk changes to meet the needs of a growing baby. It is all a baby needs for the first six months of life.    In fact, babies who receive only human milk for the  first six months are less likely to develop colds, the  flu, colic, asthma, ear infections, food allergies and  diarrhea (loose, watery stools). They may be less likely      to be overweight as children, and they are less likely to develop diabetes later in life. Some studies also show that infants have a higher IQ if they are .    Breastfeeding can:    ? Help you and your baby develop a special bond--  and make you feel proud that you can feed your  Baby.    ? Reduce the total amount of blood you will lose  after delivery.    ? Help your uterus return to its non-pregnant size.    ? Reduce the risk of Sudden Infant Death Syndrome (SIDS).    ? Help you lose your pregnancy weight more quickly.    ? Help delay the return of your monthly periods.    ? Lower your risk of some breast and ovarian  cancers--as well as osteoporosis (bone loss)--later in life.    ? Save you more than $300 per month. (This  includes the cost of formula and medical bills.  Formula-fed babies get sick more often.)          If you are deaf or hard of hearing, please let us know. We provide many free services including  sign language interpreters, oral interpreters, TTYs, telephone amplifiers, note takers and written materials.        How to breastfeed    Skin-to-skin contact    Hold your baby on your chest skin-to-skin right after  birth. Skin contact calms your baby, steadies their  breathing and keeps your baby warm. Your baby will be alert and will likely want to feed within the first hour after birth.    Babies are born with reflexes that help them  breastfeed. Your body will be ready with early milk  (called colostrum), so you will have all the milk your  baby needs for that first feeding. Your nurse will help you get started.    Keep  your baby with you and breastfeed whenever  your baby is hungry. Offering the breast early and  often helps your body keep making lots of milk.    How to position your baby    There are many positions for breastfeeding.              No matter which position you choose, support your  babys back, shoulders and neck. The head and body should be in a straight line, and the entire body should face the breast. Your baby should be able to tilt the head back easily. Your baby shouldnt have to reach out to feed. Also make sure your babys nose is level with your nipple. This way, your baby will find it easier to attach to your breast.    Finally, get comfortable. Use pillows to support your  body. Dont lean over or slump to reach your baby.  Once your baby is attached to the breast, its okay to change your position slightly.    You will feel a bit of a tugging at first, but you should  never feel pain. If you do, ask your nurse or lactation expert for help. She will teach you how to latch your baby onto the breast in a way that feels more comfortable.    Breastfeeding in the hospital    For the first three days after birth, your body will  produce early milk called colostrum. This milk is  full of calories and antibodies to help keep your baby healthy. It is all your baby needs for the first few days.    Remember, babies do not eat anything while inside  you. Right after birth, your baby will only need a little bit of milk (about 1 teaspoon per feeding) to get the digestive system working well. Your baby will not need any formula--your body will make the right amount of milk.    Breastfeed whenever your baby shows signs of hunger. (See next page for a list of signs.) Crying is a late sign of hunger.    Babies often lose weight in the days after birth. This  is normal. By two weeks of age, your baby should be back to their birth weight. Your care team will watch your babys weight carefully.    If you are unable to breastfeed  in the hospital, your  care team may suggest pasteurized donor human milk for your baby.               The Most Important Points to Remember    ? Your breast milk is the perfect food for your  baby. Breastfeeding has a lot of health benefits  for you as well.    ? Hold your baby skin-to-skin as soon as possible  after birth. Do this for as long as you can. Even  if your baby doesnt go to the breast right away,  skin-to-skin contact helps your body make  more milk. This lets you get an early start on  Breastfeeding.    ? Learn how to position your baby at the breast.  This will help your baby feed well, and it will  keep you comfortable.    ? Feed your baby whenever your baby wants to  eat. You are feeding a baby, not a clock!    ? Signs that your baby is ready to eat include:  starting to wake up, chewing on fists, moving  the face from side to side, opening and closing  the mouth, sticking out the tongue and turning  toward the breast when held. Crying is a late  sign of hunger, so look for the earlier signals  that your baby makes.    ? Feed your baby only human milk for at least  six months. The World Health Organization  recommends breastfeeding for the first year,  noting it is a warren baby who is  for  the first two years.    ? While in the hospital, plan to keep your baby  with you at all times, except for certain medical  procedures. This is an important time for you  and your baby to get to know each other and  practice breastfeeding.     Common questions    Below are questions that many women have about  breastfeeding. You will find further information in the  childbirth book your care team gave you. If you have more questions, speak with your midwife, nurse or doctor.    How do I involve my partner, family and  friends and get their help and support?    Sometimes family and friends dont understand why  you want to breastfeed. Perhaps they themselves  didnt breastfeed, or they werent  as  infants. Tell them about the benefits of breastfeeding and how important it is to feed only human milk for the first six months or so. If you feed often, you will make plenty of milk to help your baby grow, fight illness and get the best start possible.    After two to four weeks--once your baby is feeding  well and your milk supply is well established--your  partner and others can feed the baby your milk from  a cup, dropper or bottle. You can remove milk from  your breast (by hand or pump) and store it for later  use. This way, your baby will have your milk even  when youre away.    Remind everyone that there are lots of ways to help  that dont involve feeding: making meals, caring for  your other children, comforting the baby if they cries, changing diapers, running errands and more.    Is breastfeeding painful?    Not usually. There are ways to prevent pain and to  treat it if it happens.    The best ways to avoid pain are to feed your baby  often, use a good position and correctly latch your  baby onto the breast. These help prevent the two  most common sources of pain: sore nipples and  engorgement (overly full breasts). You will learn more about these topics in a breastfeeding class and in the hospital after your baby is born.         How much time does it take to breastfeed?    Some new mothers feel that all they do is breastfeed. In the early weeks, a baby eats 8 to 12 times per day. Sometimes babies will cluster feedings close together. At other times, there are longer stretches between feedings.    Remember, your newborns stomach will be about  the size of a walnut. Your baby wont eat much at each feeding. If you feed your baby often in the first days, your baby--and your milk supply--will grow. Your baby will eat more at each session, and you will need to nurse less often. Within a few weeks, most women find that breastfeeding is easier and takes less time than formula feeding.    How will I know if my  baby is getting  enough milk?    Your body will start making milk as soon as your  baby is born. The more often you put your baby to  breast, the more milk you will make. You should avoid pacifiers for the first several weeks until breastfeeding is well established--you want your baby to do all their sucking at the breast. This will help you make more milk.    There are signs that your baby is getting enough milk. You will learn these signs over time. For example:    ? You will count wet and soiled diapers, because  these show how much milk your baby is getting.    ? Your baby will seem satisfied after feedings.    ? Your baby will grow and gain weight after the first  few days.    Are there reasons why a woman shouldnt  breastfeed her baby?    There are a few medical concerns that prevent  breastfeeding. In mothers, these include being HIVpositive, having active or untreated TB (tuberculosis)  and using street drugs or some medicines. These  mothers usually choose infant formula for their  Babies.    A few women choose not to breastfeed for personal  reasons. But most mothers can breastfeed. If you are not sure if its okay to breastfeed, ask your care team.    Getting support    Before your baby is born, get as much information  as you can. Sign up for a breastfeeding class. Most  childbirth classes discuss breastfeeding, but a special class will give more in-depth information.    ? In the [facility]: Go to [facility]  Center at [facility].    ? In [facility]: Go to [email or web address].  Click on Classes-and Events at the bottom of the  page, then search for breastfeeding. Or, call [phone]- [phone].    Remember, help is available from your hospital, clinic, lactation experts or online. If you need help after leaving the hospital:    ? Call your babys clinic. (If you dont have a clinic,  call [phone] and ask for a referral.)    ? Call a lactation consultant. (If you need help  finding a  location that offers lactation support, call  [phone].)    ? Call [facility] (24 hours a  day) at [phone] [[phone]].    ? Call the Women, Infants and Children (WIC)  program at 1-404.568.1489.    ? Call the National Womens Health Information  Center (English and [place]) at 1-824.175.3907 or  go to www.womenshealth.gov/breastfeeding.    ? Go to [employer].       For informational purposes only. Not to replace the advice of your health care provider. Copyright   2010 [place] [employer]  [facility]. All rights reserved. Clinically reviewed by [facility]. [employer] 116382 - REV 06/18.

## 2021-06-18 NOTE — PATIENT INSTRUCTIONS - HE
Patient Instructions by Nisla Nelson DO at 1/28/2020  9:20 AM     Author: Nilsa Nelson DO Service: -- Author Type: Physician    Filed: 1/28/2020 10:05 AM Encounter Date: 1/28/2020 Status: Signed    : Nilsa Nelson DO (Physician)       Patient Education   HEALTHY PREGNANCY CARE: 26-30 WEEKS PREGNANT    You are now in your last trimester of pregnancy. Your baby is growing rapidly, can open and close her eyelids, sometimes get hiccups, and you'll probably feel her moving around more often. Your baby has breathing movements and is gaining about one ounce each day. You may notice heartburn and leg cramps. Your back may ache as your body gets used to your baby's size and length.    Discuss your work situation with your midwife or physician as needed. If you stand for long periods of time, you may need to make changes and take breaks.    Pre-register online at the hospital where your baby will be born ([email or web address])     Be aware of your baby's activity level. You may be asked to do daily fetal movement counts. Contact your midwife or physician about any decreased movement.    You may have been tested for gestational diabetes today. If you are RH negative, you may have had an additional test and a Rhogam injection.    Consider receiving a Tdap vaccination to protect your baby from Pertussis/whooping cough.    If you are considering tubal ligation, discuss this with your midwife or physician. You will need to have an appointment with the obstetrician who will do the surgery to discuss the risks, benefits, and alternatives, and to sign the consent. This can be discussed at your next visit.    Continue to watch for any signs or symptoms of premature labor:     Regular contractions. This means having about 6 or more within 1 hour, even after you have had a glass of water and are resting.     A backache that starts and stops regularly.    An increase or change  "in vaginal discharge, such as heavy, mucus-like, watery, or bloody discharge.     Your water breaks or leaks.    Continue to watch for signs and symptoms of preeclampsia:     Sudden swelling of your face, hands, or feet     New vision problems such as blurring, double vision, or flashing lights    A severe headache not relieved with acetaminophen (Tylenol)    Sharp or stabbing pain in your right or middle upper abdomen    If you have any of the above symptoms or any other concerns, call your midwife or physician or their clinic staff at [facility] FAMILY [facility]/OB at Phone: [phone]. If it's after clinic hours, physician patients should call the [facility] at [phone]); midwife patients should call their answering service at [phone].    How can you care for yourself at home?   You can refer to the Starting Out Right book or find it online at [email or web address] or [email or web address]     You can sign up for a weekly parenting e-mail that gives support, tips and advice from health care professionals that starts with pregnancy and continues through the toddler years. To register, go to [email or web address] at any time during your pregnancy.    BREASTFEEDING TIPS FOR NEW MOMS     Importance of skin-to-skin contact:  ? Gets breastfeeding off to a good start  ? Keeps baby warm and is great for bonding  ? Provides calming effects and helps to stabilize breathing and  blood sugar  ? Helps the uterus to contract and bleed less    Importance of feeding whenever baby shows signs of  being hungry, \"feeding on cue\":  ? Helps create a good milk supply appropriate to the babys needs  ? Less breastfeeding complications such as engorgement or  low supply  ? Helps baby get enough milk  ? Milk supply is determined by how often baby " nurses and empties  the breast.  ? Feeding is for comfort as well as nutrition    Importance of good latch (positioning and attaching  baby properly at breast):  ? Helps prevent sore nipples  ? Helps ensure baby gets enough milk and supports moms breast  milk supply    Risks of giving baby supplements other  than moms breastmilk  Breastfeeding alone is recommended for the first 6 months, if not it:  ? Can make baby more prone to illness  ? Can make baby less satisfied at breast (wanting larger amounts or  faster flow)  ? Reduces milk supply    Importance of rooming-in:  ? Increases parent confidence while mother is supported by the  hospital staff  ? Caregivers learn how to care for baby and recognize feeding cues  ? Enables feeding whenever baby needs to eat  ? Baby is comforted with mom and learns to recognize caregivers    Avoiding use of pacifiers:  ? Use of pacifiers in the first weeks can make it difficult to make a full  milk supply  ? May interfere with baby learning to latch well      2017 [facility] [place] [id number] [id number]. DOD 11.17           Breastfeeding   Why Its Important and What to Expect     Your breast milk is the best food for your baby--and  breastfeeding can help you be healthy as well.    Though breastfeeding is natural, it is a learned process for both mother and baby. To prepare, there are things you can learn--and do--before your baby is born.    ? Learn the benefits of breastfeeding.    ? Understand the basic process.    ? Know what to expect in the hospital.    ? Arrange breastfeeding support for the first few  weeks after birth.    ? Take a breastfeeding class (see back page).    ? Talk to your midwife, nurse or doctor if you have  Questions.    The benefits of breastfeeding    Human milk changes to meet the needs of a growing baby. It is all a baby needs for the first six months of life.    In fact, babies who receive only human milk for the  first six months are less likely to develop colds,  the  flu, colic, asthma, ear infections, food allergies and  diarrhea (loose, watery stools). They may be less likely      to be overweight as children, and they are less likely to develop diabetes later in life. Some studies also show that infants have a higher IQ if they are .    Breastfeeding can:    ? Help you and your baby develop a special bond--  and make you feel proud that you can feed your  Baby.    ? Reduce the total amount of blood you will lose  after delivery.    ? Help your uterus return to its non-pregnant size.    ? Reduce the risk of Sudden Infant Death Syndrome (SIDS).    ? Help you lose your pregnancy weight more quickly.    ? Help delay the return of your monthly periods.    ? Lower your risk of some breast and ovarian  cancers--as well as osteoporosis (bone loss)--later in life.    ? Save you more than $300 per month. (This  includes the cost of formula and medical bills.  Formula-fed babies get sick more often.)          If you are deaf or hard of hearing, please let us know. We provide many free services including  sign language interpreters, oral interpreters, TTYs, telephone amplifiers, note takers and written materials.        How to breastfeed    Skin-to-skin contact    Hold your baby on your chest skin-to-skin right after  birth. Skin contact calms your baby, steadies their  breathing and keeps your baby warm. Your baby will be alert and will likely want to feed within the first hour after birth.    Babies are born with reflexes that help them  breastfeed. Your body will be ready with early milk  (called colostrum), so you will have all the milk your  baby needs for that first feeding. Your nurse will help you get started.    Keep your baby with you and breastfeed whenever  your baby is hungry. Offering the breast early and  often helps your body keep making lots of milk.    How to position your baby    There are many positions for breastfeeding.              No matter which  position you choose, support your  babys back, shoulders and neck. The head and body should be in a straight line, and the entire body should face the breast. Your baby should be able to tilt the head back easily. Your baby shouldnt have to reach out to feed. Also make sure your babys nose is level with your nipple. This way, your baby will find it easier to attach to your breast.    Finally, get comfortable. Use pillows to support your  body. Dont lean over or slump to reach your baby.  Once your baby is attached to the breast, its okay to change your position slightly.    You will feel a bit of a tugging at first, but you should  never feel pain. If you do, ask your nurse or lactation expert for help. She will teach you how to latch your baby onto the breast in a way that feels more comfortable.    Breastfeeding in the hospital    For the first three days after birth, your body will  produce early milk called colostrum. This milk is  full of calories and antibodies to help keep your baby healthy. It is all your baby needs for the first few days.    Remember, babies do not eat anything while inside  you. Right after birth, your baby will only need a little bit of milk (about 1 teaspoon per feeding) to get the digestive system working well. Your baby will not need any formula--your body will make the right amount of milk.    Breastfeed whenever your baby shows signs of hunger. (See next page for a list of signs.) Crying is a late sign of hunger.    Babies often lose weight in the days after birth. This  is normal. By two weeks of age, your baby should be back to their birth weight. Your care team will watch your babys weight carefully.    If you are unable to breastfeed in the hospital, your  care team may suggest pasteurized donor human milk for your baby.             The Most Important Points to Remember    ? Your breast milk is the perfect food for your  baby. Breastfeeding has a lot of health benefits  for you  as well.    ? Hold your baby skin-to-skin as soon as possible  after birth. Do this for as long as you can. Even  if your baby doesnt go to the breast right away,  skin-to-skin contact helps your body make  more milk. This lets you get an early start on  Breastfeeding.    ? Learn how to position your baby at the breast.  This will help your baby feed well, and it will  keep you comfortable.    ? Feed your baby whenever your baby wants to  eat. You are feeding a baby, not a clock!    ? Signs that your baby is ready to eat include:  starting to wake up, chewing on fists, moving  the face from side to side, opening and closing  the mouth, sticking out the tongue and turning  toward the breast when held. Crying is a late  sign of hunger, so look for the earlier signals  that your baby makes.    ? Feed your baby only human milk for at least  six months. The World Health Organization  recommends breastfeeding for the first year,  noting it is a warren baby who is  for  the first two years.    ? While in the hospital, plan to keep your baby  with you at all times, except for certain medical  procedures. This is an important time for you  and your baby to get to know each other and  practice breastfeeding.     Common questions    Below are questions that many women have about  breastfeeding. You will find further information in the  childbirth book your care team gave you. If you have more questions, speak with your midwife, nurse or doctor.    How do I involve my partner, family and  friends and get their help and support?    Sometimes family and friends dont understand why  you want to breastfeed. Perhaps they themselves  didnt breastfeed, or they werent  as infants. Tell them about the benefits of breastfeeding and how important it is to feed only human milk for the first six months or so. If you feed often, you will make plenty of milk to help your baby grow, fight illness and get the best start  possible.    After two to four weeks--once your baby is feeding  well and your milk supply is well established--your  partner and others can feed the baby your milk from  a cup, dropper or bottle. You can remove milk from  your breast (by hand or pump) and store it for later  use. This way, your baby will have your milk even  when youre away.    Remind everyone that there are lots of ways to help  that dont involve feeding: making meals, caring for  your other children, comforting the baby if they cries, changing diapers, running errands and more.    Is breastfeeding painful?    Not usually. There are ways to prevent pain and to  treat it if it happens.    The best ways to avoid pain are to feed your baby  often, use a good position and correctly latch your  baby onto the breast. These help prevent the two  most common sources of pain: sore nipples and  engorgement (overly full breasts). You will learn more about these topics in a breastfeeding class and in the hospital after your baby is born.         How much time does it take to breastfeed?    Some new mothers feel that all they do is breastfeed. In the early weeks, a baby eats 8 to 12 times per day. Sometimes babies will cluster feedings close together. At other times, there are longer stretches between feedings.    Remember, your newborns stomach will be about  the size of a walnut. Your baby wont eat much at each feeding. If you feed your baby often in the first days, your baby--and your milk supply--will grow. Your baby will eat more at each session, and you will need to nurse less often. Within a few weeks, most women find that breastfeeding is easier and takes less time than formula feeding.    How will I know if my baby is getting  enough milk?    Your body will start making milk as soon as your  baby is born. The more often you put your baby to  breast, the more milk you will make. You should avoid pacifiers for the first several weeks until breastfeeding  is well established--you want your baby to do all their sucking at the breast. This will help you make more milk.    There are signs that your baby is getting enough milk. You will learn these signs over time. For example:    ? You will count wet and soiled diapers, because  these show how much milk your baby is getting.    ? Your baby will seem satisfied after feedings.    ? Your baby will grow and gain weight after the first  few days.    Are there reasons why a woman shouldnt  breastfeed her baby?    There are a few medical concerns that prevent  breastfeeding. In mothers, these include being HIVpositive, having active or untreated TB (tuberculosis)  and using street drugs or some medicines. These  mothers usually choose infant formula for their  Babies.    A few women choose not to breastfeed for personal  reasons. But most mothers can breastfeed. If you are not sure if its okay to breastfeed, ask your care team.    Getting support    Before your baby is born, get as much information  as you can. Sign up for a breastfeeding class. Most  childbirth classes discuss breastfeeding, but a special class will give more in-depth information.    ? In [facility]: Go to [facility]  Center at Clinicbook.    ? In [facility]: Go to [email or web address].  Click on Classes-and Events at the bottom of the  page, then search for breastfeeding. Or, call [phone].    Remember, help is available from your hospital, clinic, lactation experts or online. If you need help after leaving the hospital:    ? Call your babys clinic. (If you dont have a clinic,  call [phone] and ask for a referral.)    ? Call a lactation consultant. (If you need help  finding a location that offers lactation support, call  [phone].)    ? Call [employer] (24 hours a  day) at 2-117-RGBrainloop [[phone]].    ? Call the Women, Infants and Children (WIC)  program at 1-654.246.6005.    ? Call the  National Womens Health Information  Center (English and Azeri) at 1-220.524.7507 or  go to www.womenshealth.gov/breastfeeding.    ? Go to [employer].     For informational purposes only. Not to replace the advice of your health care provider. Copyright   2010 [employer]. All rights reserved. Clinically reviewed by [facility]. [employer] [id number] - REV 06/18.

## 2021-06-18 NOTE — PATIENT INSTRUCTIONS - HE
"Patient Instructions by Trista Freed MD at 8/11/2020  2:00 PM     Author: Trista Freed MD Service: -- Author Type: Physician    Filed: 8/11/2020  2:03 PM Encounter Date: 8/11/2020 Status: Signed    : Trista Freed MD (Physician)         Patient Education     Bladder Infection, Female (Adult)    Urine is normally doesn't have any bacteria in it. But bacteria can get into the urinary tract from the skin around the rectum. Or they can travel in the blood from elsewhere in the body. Once they are in your urinary tract, they can cause infection in the urethra (urethritis), the bladder (cystitis), or the kidneys (pyelonephritis).  The most common place for an infection is in the bladder. This is called a bladder infection. This is one of the most common infections in women. Most bladder infections are easily treated. They are not serious unless the infection spreads to the kidney.  The phrases \"bladder infection,\" \"UTI,\" and \"cystitis\" are often used to describe the same thing. But they are not always the same. Cystitis is an inflammation of the bladder. The most common cause of cystitis is an infection.  Symptoms  The infection causes inflammation in the urethra and bladder. This causes many of the symptoms. The most common symptoms of a bladder infection are:    Pain or burning when urinating    Having to urinate more often than usual    Urgent need to urinate    Only a small amount of urine comes out    Blood in urine    Abdominal discomfort. This is usually in the lower abdomen above the pubic bone.    Cloudy urine    Strong- or bad-smelling urine    Unable to urinate (urinary retention)    Unable to hold urine in (urinary incontinence)    Fever    Loss of appetite    Confusion (in older adults)  Causes  Bladder infections are not contagious. You can't get one from someone else, from a toilet seat, or from sharing a bath.  The most common cause of bladder infections is bacteria from the bowels. The " bacteria get onto the skin around the opening of the urethra. From there, they can get into the urine and travel up to the bladder, causing inflammation and infection. This usually happens because of:    Wiping improperly after urinating. Always wipe from front to back.    Bowel incontinence    Pregnancy    Procedures such as having a catheter inserted    Older age    Not emptying your bladder. This can allow bacteria a chance to grow in your urine.    Dehydration    Constipation    Sex    Use of a diaphragm for birth control   Treatment  Bladder infections are diagnosed by a urine test. They are treated with antibiotics and usually clear up quickly without complications. Treatment helps prevent a more serious kidney infection.  Medicines  Medicines can help in the treatment of a bladder infection:    Take antibiotics until they are used up, even if you feel better. It is important to finish them to make sure the infection has cleared.    You can use acetaminophen or ibuprofen for pain, fever, or discomfort, unless another medicine was prescribed. If you have chronic liver or kidney disease, talk with your healthcare provider before using these medicines. Also talk with your provider if you've ever had a stomach ulcer or gastrointestinal bleeding, or are taking blood-thinner medicines.    If you are given phenazopydridine to reduce burning with urination, it will cause your urine to become a bright orange color. This can stain clothing.  Care and prevention  These self-care steps can help prevent future infections:    Drink plenty of fluids to prevent dehydration and flush out your bladder. Do this unless you must restrict fluids for other health reasons, or your doctor told you not to.    Proper cleaning after going to the bathroom is important. Wipe from front to back after using the toilet to prevent the spread of bacteria.    Urinate more often. Don't try to hold urine in for a long time.    Wear loose-fitting  clothes and cotton underwear. Avoid tight-fitting pants.    Improve your diet and prevent constipation. Eat more fresh fruit and vegetables, and fiber, and less junk and fatty foods.    Avoid sex until your symptoms are gone.    Avoid caffeine, alcohol, and spicy foods. These can irritate your bladder.    Urinate right after intercourse to flush out your bladder.    If you use birth control pills and have frequent bladder infections, discuss it with your doctor.  Follow-up care  Call your healthcare provider if all symptoms are not gone after 3 days of treatment. This is especially important if you have repeat infections.  If a culture was done, you will be told if your treatment needs to be changed. If directed, you can call to find out the results.  If X-rays were done, you will be told if the results will affect your treatment.  Call 911  Call 911 if any of the following occur:    Trouble breathing    Hard to wake up or confusion    Fainting or loss of consciousness    Rapid heart rate  When to seek medical advice  Call your healthcare provider right away if any of these occur:    Fever of 100.4 F (38.0 C) or higher, or as directed by your healthcare provider    Symptoms are not better by the third day of treatment    Back or belly (abdominal) pain that gets worse    Repeated vomiting, or unable to keep medicine down    Weakness or dizziness    Vaginal discharge    Pain, redness, or swelling in the outer vaginal area (labia)  Date Last Reviewed: 10/1/2016    1918-8208 [employer]. [address] [place] [address], [place] [address]. All rights reserved. This information is not intended as a substitute for professional medical care. Always follow your healthcare professional's instructions.

## 2021-06-19 NOTE — LETTER
Letter by Debbie Mccann PA-C at      Author: Debbie Mccann PA-C Service: -- Author Type: --    Filed:  Encounter Date: 10/1/2019 Status: Signed         10/01/19    To Whom It May Concern:    Neeraj Cid was seen today at [facility] for Pregnancy Confirmation.    She is 10w4d.    Due Date: Estimated Date of Delivery: 4/24/20     Thank you.    Debbie Mccann PA-C

## 2021-06-20 NOTE — LETTER
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 After 40 hours her legs and feet hurt and she gets very fatigued. Getting worse as pregnancy continues. May return to normal hours after the pregnancy.       DISCHARGE  Diagnosis: pregnancy, Leg pain/foot pain in pregnancy, fatigue  Follow-Up Appt:  In 4 weeks    TREATMENT PLAN  Routine OB visits    Expected Full Duty Date:  Estimated Delivery Date 4/24/20. After maternity leave    Provider Signature:  ________________________________  1/28/2020  Nilsa Nelson DO

## 2021-06-20 NOTE — LETTER
Letter by Nilsa Nelson DO at      Author: Nilsa Nelson DO Service: -- Author Type: --    Filed:  Encounter Date: 3/25/2020 Status: (Other)         March 27, 2020     Patient: Neeraj Cid   YOB: 1990   Date of Visit: 3/25/2020       To Whom It May Concern:    It is my medical opinion that Neeraj Cid is pregnant with TERRELL of 4/24/20. We are learning more and more about COVID-19 on a daily basis.  At this point we are treating pregnant women as a high risk population.  Please allow patient to remain at home until delivery of her baby to keep her from being at a higher risk of gabi this virus.     If you have any questions or concerns, please don't hesitate to call.    Sincerely,        Electronically signed by Nilsa Nelson DO

## 2021-06-20 NOTE — LETTER
Letter by Rosa Maria العراقي CHW at      Author: Rosa Maria العراقي CHW Service: -- Author Type: --    Filed:  Encounter Date: 4/7/2020 Status: (Other)       CARE COORDINATION  [facility]   [address] [place] [address]  April 9, 2020    Neeraj Cid  [address]  [place] [address]      Dear Neeraj,    I am a clinic care coordinator who works with Nilsa Nelson DO at [facility]. I wanted to introduce myself and provide you with my contact information for you to be able to call me with any questions or concerns. Below is a description of clinic care coordination and how I can further assist you.      The clinic care coordinator team is made up of a registered nurse,  and community health worker who understand the health care system. The goal of clinic care coordination is to help you manage your health and improve access to the health care system in the most efficient manner. The team can assist you in meeting your health care goals by providing education, coordinating services, strengthening the communication among your providers  and supporting you with any resource needs.    Please feel free to contact the Community Health Worker, at [phone] with any questions or concerns. We are focused on providing you with the highest-quality healthcare experience possible and that all starts with you.     Sincerely,     JOSE MARIA Huerta

## 2021-06-20 NOTE — LETTER
Letter by Nilsa Nelson DO at      Author: Nilsa Nelson DO Service: -- Author Type: --    Filed:  Encounter Date: 12/31/2019 Status: Signed         December 31, 2019     Patient: Neeraj Cid   YOB: 1990   Date of Visit: 12/31/2019       To Whom It May Concern:    It is my medical opinion that Neeraj Cid is pregnant. She can only work Monday through Friday, no overtime.     If you have any questions or concerns, please don't hesitate to call.    Sincerely,        Electronically signed by Nilsa Nelson DO

## 2021-06-27 ENCOUNTER — HEALTH MAINTENANCE LETTER (OUTPATIENT)
Age: 31
End: 2021-06-27

## 2021-07-14 PROBLEM — Z34.83 ENCOUNTER FOR SUPERVISION OF OTHER NORMAL PREGNANCY IN THIRD TRIMESTER: Status: RESOLVED | Noted: 2020-03-17 | Resolved: 2020-06-05

## 2021-07-14 PROBLEM — Z3A.10 10 WEEKS GESTATION OF PREGNANCY: Status: RESOLVED | Noted: 2019-10-01 | Resolved: 2019-12-03

## 2021-07-14 PROBLEM — Z34.90 PREGNANT: Status: RESOLVED | Noted: 2020-04-22 | Resolved: 2020-06-05

## 2021-09-08 ENCOUNTER — HOSPITAL ENCOUNTER (EMERGENCY)
Facility: HOSPITAL | Age: 31
Discharge: HOME | End: 2021-09-08
Attending: EMERGENCY MEDICINE | Admitting: EMERGENCY MEDICINE
Payer: COMMERCIAL

## 2021-09-08 ENCOUNTER — APPOINTMENT (OUTPATIENT)
Dept: CT IMAGING | Facility: HOSPITAL | Age: 31
End: 2021-09-08
Attending: EMERGENCY MEDICINE
Payer: COMMERCIAL

## 2021-09-08 VITALS
OXYGEN SATURATION: 100 % | DIASTOLIC BLOOD PRESSURE: 79 MMHG | BODY MASS INDEX: 21.62 KG/M2 | RESPIRATION RATE: 18 BRPM | SYSTOLIC BLOOD PRESSURE: 114 MMHG | HEIGHT: 63 IN | WEIGHT: 122 LBS | TEMPERATURE: 98.2 F | HEART RATE: 72 BPM

## 2021-09-08 DIAGNOSIS — R55 SYNCOPE, UNSPECIFIED SYNCOPE TYPE: ICD-10-CM

## 2021-09-08 LAB
ANION GAP SERPL CALCULATED.3IONS-SCNC: 10 MMOL/L (ref 5–18)
BASOPHILS # BLD AUTO: 0 10E3/UL (ref 0–0.2)
BASOPHILS NFR BLD AUTO: 1 %
BUN SERPL-MCNC: 10 MG/DL (ref 8–22)
CALCIUM SERPL-MCNC: 9.8 MG/DL (ref 8.5–10.5)
CHLORIDE BLD-SCNC: 102 MMOL/L (ref 98–107)
CO2 SERPL-SCNC: 27 MMOL/L (ref 22–31)
CREAT SERPL-MCNC: 0.67 MG/DL (ref 0.6–1.1)
EOSINOPHIL # BLD AUTO: 0.2 10E3/UL (ref 0–0.7)
EOSINOPHIL NFR BLD AUTO: 2 %
ERYTHROCYTE [DISTWIDTH] IN BLOOD BY AUTOMATED COUNT: 12.2 % (ref 10–15)
GFR SERPL CREATININE-BSD FRML MDRD: >90 ML/MIN/1.73M2
GLUCOSE BLD-MCNC: 94 MG/DL (ref 70–125)
HCG SERPL QL: NEGATIVE
HCT VFR BLD AUTO: 41.5 % (ref 35–47)
HGB BLD-MCNC: 13.4 G/DL (ref 11.7–15.7)
IMM GRANULOCYTES # BLD: 0 10E3/UL
IMM GRANULOCYTES NFR BLD: 0 %
LYMPHOCYTES # BLD AUTO: 3.3 10E3/UL (ref 0.8–5.3)
LYMPHOCYTES NFR BLD AUTO: 40 %
MCH RBC QN AUTO: 28.6 PG (ref 26.5–33)
MCHC RBC AUTO-ENTMCNC: 32.3 G/DL (ref 31.5–36.5)
MCV RBC AUTO: 89 FL (ref 78–100)
MONOCYTES # BLD AUTO: 0.5 10E3/UL (ref 0–1.3)
MONOCYTES NFR BLD AUTO: 6 %
NEUTROPHILS # BLD AUTO: 4.2 10E3/UL (ref 1.6–8.3)
NEUTROPHILS NFR BLD AUTO: 51 %
NRBC # BLD AUTO: 0 10E3/UL
NRBC BLD AUTO-RTO: 0 /100
PLATELET # BLD AUTO: 243 10E3/UL (ref 150–450)
POTASSIUM BLD-SCNC: 4.2 MMOL/L (ref 3.5–5)
RBC # BLD AUTO: 4.68 10E6/UL (ref 3.8–5.2)
SODIUM SERPL-SCNC: 139 MMOL/L (ref 136–145)
TROPONIN I SERPL-MCNC: <0.01 NG/ML (ref 0–0.29)
WBC # BLD AUTO: 8.1 10E3/UL (ref 4–11)

## 2021-09-08 PROCEDURE — 250N000011 HC RX IP 250 OP 636: Performed by: EMERGENCY MEDICINE

## 2021-09-08 PROCEDURE — 36415 COLL VENOUS BLD VENIPUNCTURE: CPT | Performed by: EMERGENCY MEDICINE

## 2021-09-08 PROCEDURE — 258N000003 HC RX IP 258 OP 636: Performed by: EMERGENCY MEDICINE

## 2021-09-08 PROCEDURE — 70450 CT HEAD/BRAIN W/O DYE: CPT

## 2021-09-08 PROCEDURE — 84703 CHORIONIC GONADOTROPIN ASSAY: CPT | Performed by: EMERGENCY MEDICINE

## 2021-09-08 PROCEDURE — 71275 CT ANGIOGRAPHY CHEST: CPT

## 2021-09-08 PROCEDURE — 93005 ELECTROCARDIOGRAM TRACING: CPT | Performed by: EMERGENCY MEDICINE

## 2021-09-08 PROCEDURE — 96360 HYDRATION IV INFUSION INIT: CPT | Mod: 59

## 2021-09-08 PROCEDURE — 96361 HYDRATE IV INFUSION ADD-ON: CPT

## 2021-09-08 PROCEDURE — 99285 EMERGENCY DEPT VISIT HI MDM: CPT | Mod: 25

## 2021-09-08 PROCEDURE — 80048 BASIC METABOLIC PNL TOTAL CA: CPT | Performed by: EMERGENCY MEDICINE

## 2021-09-08 PROCEDURE — 84484 ASSAY OF TROPONIN QUANT: CPT | Performed by: EMERGENCY MEDICINE

## 2021-09-08 PROCEDURE — 85025 COMPLETE CBC W/AUTO DIFF WBC: CPT | Performed by: EMERGENCY MEDICINE

## 2021-09-08 PROCEDURE — 93005 ELECTROCARDIOGRAM TRACING: CPT

## 2021-09-08 RX ORDER — IOPAMIDOL 755 MG/ML
100 INJECTION, SOLUTION INTRAVASCULAR ONCE
Status: COMPLETED | OUTPATIENT
Start: 2021-09-08 | End: 2021-09-08

## 2021-09-08 RX ADMIN — SODIUM CHLORIDE 500 ML: 9 INJECTION, SOLUTION INTRAVENOUS at 19:50

## 2021-09-08 RX ADMIN — IOPAMIDOL 100 ML: 755 INJECTION, SOLUTION INTRAVENOUS at 21:22

## 2021-09-08 ASSESSMENT — MIFFLIN-ST. JEOR: SCORE: 1232.52

## 2021-09-08 NOTE — ED TRIAGE NOTES
The patient had a syncopal episode last night. The patient was going to the bathroom and suddenly lost consciousness. The patient's  was with her and stated that she did lose consciousness for a brief time. The patient went back to bed and slept the rest of the night. The patient had a complaint of dizziness along with the syncopal episode. The patient went to work today , but felt dizzy all day and tired. The patient is here because of the dizziness and the syncopal episode that happened last night.

## 2021-09-09 LAB
ATRIAL RATE - MUSE: 59 BPM
DIASTOLIC BLOOD PRESSURE - MUSE: NORMAL MMHG
INTERPRETATION ECG - MUSE: NORMAL
P AXIS - MUSE: 44 DEGREES
PR INTERVAL - MUSE: 142 MS
QRS DURATION - MUSE: 84 MS
QT - MUSE: 422 MS
QTC - MUSE: 417 MS
R AXIS - MUSE: 58 DEGREES
SYSTOLIC BLOOD PRESSURE - MUSE: NORMAL MMHG
T AXIS - MUSE: 49 DEGREES
VENTRICULAR RATE- MUSE: 59 BPM

## 2021-09-09 NOTE — ED PROVIDER NOTES
EMERGENCY DEPARTMENT ENCOUNTER      NAME: Neeraj Cid  AGE: 31 year old female  YOB: 1990  MRN: [id number]  EVALUATION DATE & TIME: 9/8/2021  6:41 PM    PCP: Nilsa Nelson    ED PROVIDER: Ben Isabel M.D.      Chief Complaint   Patient presents with     Loss of Consciousness         FINAL IMPRESSION:  1. Syncope, unspecified syncope type          ED COURSE & MEDICAL DECISION MAKING:    Pertinent Labs & Imaging studies reviewed. (See chart for details)    7:25 PM Met with patient for initial interview and exam. Plan for care in the ED was discussed. I saw the patient wearing an N95, eye protection, and gloves.  10:14 PM I pulled language line up to talk with the patient and update them on workup results.     31 year old female presents to the Emergency Department for evaluation of syncope. Patient appears non toxic with stable vitals signs, patient is afebrile with no tachycardia or hypoxia. Overall exam is benign.  Lungs are clear and abdomen is benign.  Patient denies any ripping or tearing chest discomfort the back or shoulders, pleurisy, hemoptysis, fever or productive cough.  Denies any change in bowel or bladder habits, no blood in her urine or stools, no vaginal bleeding or discharge.  She was not actively passing urine or stool at the time of her syncope she was on the way to the bathroom when she felt lightheaded, afterwards had some chest tightness and shortness of breath which is markedly improved.  Considered PE, low suspicion for intracranial bleed or mass, she denies any headache, she is a GCS of 15 with no focal neuro deficits, I do not appreciate any murmurs on exam with nothing to suggest valvular process, low suspicion for ACS, considered anemia, dysrhythmia, electrolyte abnormality.  Patient takes no prescription medications and certainly nothing to suggest medication induced syncope.  We will obtain screening labs head CT and CT imaging of the chest as well as an ECG.  Patient  was given IV fluids.    Reassessment: Troponin negative, ECG nonischemic, rest of labs showed no acute concerning findings and imaging studies he reported no acute concerning findings.  Patient had normal vitals, no active chest pain or shortness of breath, negative work-up and benign exam, ultimately I feel she is safe for discharge and close follow-up.  On repeat exam the patient appears quite well and comfortable and again currently denies any chest pain or shortness of breath.   present at the bedside states the patient recently took a new job and has had an altered sleeping pattern and has not been sleeping well at night secondary to kids sleeping in bed with the parents, likely her lack of sleep is contributing to her symptoms.  Will discharge with recommendations to follow-up with cardiology for possible need of Holter monitor and continued outpatient work-up of syncopal episode.  Otherwise recommend close follow-up with primary care and strict return precautions were provided.  All questions were answered and reasons to return discussed.  Patient and family felt comfortable this plan she was discharged in stable condition.    At the conclusion of the encounter I discussed the results of all of the tests and the disposition. The questions were answered and return precautions provided. The patient or family acknowledged understanding and was agreeable with the care plan.         MEDICATIONS GIVEN IN THE EMERGENCY:  Medications   0.9% sodium chloride BOLUS (0 mLs Intravenous Stopped 9/8/21 2122)   iopamidol (ISOVUE-370) solution 100 mL (100 mLs Intravenous Given 9/8/21 2122)       NEW PRESCRIPTIONS STARTED AT TODAY'S ER VISIT  Discharge Medication List as of 9/8/2021 10:27 PM               =================================================================    HPI    Patient information was obtained from: Patient    Use of Intrepreter: Yes (Phone) - Language Jefferson Cid is a 31 year old female who  presents for evaluation of syncope.    Patient had a syncopal episode last night while walking to the bathroom. She states she had dizziness described more as a light headedness prior to losing consciousness and waking up to her . Patient was not attempting to pass stool or urine at the time of her syncopal episode. She reports that following this episode she had some chest tightness and associated shortness of breath which have since improved. At present she endorses persisting light headedness. Denies chest pain.     Patient denies vomiting, fever, dysuria, or black or bloody stools. No history of heart or lung disease and patient is not on any prescription medications. Patient notes she has had one similar episode to this a little over a year ago, but was not evaluated for this. She denies any new vaginal bleeding or discharge.     REVIEW OF SYSTEMS   Constitutional:  Denies fever, chills  Respiratory: Endorses shortness of breath (resolved).  Denies productive cough or increased work of breathing  Cardiovascular: Endorses chest tightness (resolved). Denies chest pain, palpitations  GI:  Denies abdominal pain, nausea, vomiting, or change in bowel or bladder habits   Musculoskeletal:  Denies any new muscle/joint swelling  Skin:  Denies rash   Neurologic: Endorses syncopal episode and lightheadedness. Denies focal weakness  All systems negative except as marked.     PAST MEDICAL HISTORY:  Past Medical History:   Diagnosis Date     Dermoid cyst 2017    surgically removed      (normal spontaneous vaginal delivery)      Positive H. pylori test 2017     Skin tag of perianal region 2014    From faulty second degree midline laceration repair after last delivery. Repaired .     Urinary tract infection        PAST SURGICAL HISTORY:  Past Surgical History:   Procedure Laterality Date     LAPAROSCOPIC CYSTECTOMY OVARIAN (ONCOLOGY)  2017    dermoid cyst removal         CURRENT MEDICATIONS:     Prior to Admission medications    Medication Sig Start Date End Date Taking? Authorizing Provider   acetaminophen (TYLENOL) 325 MG tablet [ACETAMINOPHEN (TYLENOL) 325 MG TABLET] Take 1-2 tablets (325-650 mg total) by mouth every 4 (four) hours as needed. 4/23/20   Freda Cerda MD   docusate sodium (COLACE) 100 MG capsule [DOCUSATE SODIUM (COLACE) 100 MG CAPSULE] Take 1 capsule (100 mg total) by mouth daily. 4/24/20   Freda Cerda MD   ferrous sulfate 325 (65 FE) MG tablet [FERROUS SULFATE 325 (65 FE) MG TABLET] Take 1 tablet (325 mg total) by mouth daily with breakfast. 1/28/20   Nilsa Nelson DO   ibuprofen (ADVIL,MOTRIN) 800 MG tablet [IBUPROFEN (ADVIL,MOTRIN) 800 MG TABLET] Take 1 tablet (800 mg total) by mouth every 8 (eight) hours. 4/23/20   Freda Cerda MD   phenazopyridine (PYRIDIUM) 200 MG tablet [PHENAZOPYRIDINE (PYRIDIUM) 200 MG TABLET] Take 1 tablet (200 mg total) by mouth 3 (three) times a day as needed for pain. 8/11/20   Trista Freed MD        ALLERGIES:  No Known Allergies    FAMILY HISTORY:  Family History   Problem Relation Age of Onset     No Known Problems Mother      No Known Problems Father      No Known Problems Sister      No Known Problems Brother      No Known Problems Brother        SOCIAL HISTORY:   Social History     Socioeconomic History     Marital status:      Spouse name: Not on file     Number of children: Not on file     Years of education: Not on file     Highest education level: Not on file   Occupational History     Not on file   Tobacco Use     Smoking status: Never Smoker     Smokeless tobacco: Never Used   Substance and Sexual Activity     Alcohol use: Never     Drug use: Never     Sexual activity: Yes     Partners: Male   Other Topics Concern     Not on file   Social History Narrative    Lives with  (Sosa), in-laws and 2 children     Social Determinants of Health     Financial Resource Strain:      Difficulty of Paying Living Expenses:    Food  "Insecurity:      Worried About Running Out of Food in the Last Year:      Ran Out of Food in the Last Year:    Transportation Needs:      Lack of Transportation (Medical):      Lack of Transportation (Non-Medical):    Physical Activity:      Days of Exercise per Week:      Minutes of Exercise per Session:    Stress:      Feeling of Stress :    Social Connections:      Frequency of Communication with Friends and Family:      Frequency of Social Gatherings with Friends and Family:      Attends [employer] Services:      Active Member of Clubs or Organizations:      Attends Club or Organization Meetings:      Marital Status:    Intimate Partner Violence:      Fear of Current or Ex-Partner:      Emotionally Abused:      Physically Abused:      Sexually Abused:        VITALS:  Patient Vitals for the past 24 hrs:   BP Temp Pulse Resp SpO2 Height Weight   09/08/21 2200 114/79 -- 72 18 100 % -- --   09/08/21 1945 119/81 98.2  F (36.8  C) 65 18 100 % -- --   09/08/21 1832 128/77 -- 61 18 99 % 1.6 m (5' 3\") 55.3 kg (122 lb)        PHYSICAL EXAM    Constitutional:  Awake, alert, in no apparent distress  HENT:  Normocephalic, Atraumatic. Bilateral external ears normal. Oropharynx moist. Nose normal. Neck- Normal range of motion with no guarding, No midline cervical tenderness, Supple, No stridor.   Eyes:  PERRL, EOMI with no signs of entrapment, Conjunctiva normal, No discharge.   Respiratory:  Normal breath sounds, No respiratory distress, No wheezing.    Cardiovascular:  Normal heart rate, Normal rhythm, No appreciable rubs or gallops.   GI:  Soft, No tenderness, No distension, No palpable masses  Musculoskeletal:  Intact distal pulses, No edema. Good range of motion in all major joints. No tenderness to palpation or major deformities noted.  Integument:  Warm, Dry, No erythema, No rash.   Neurologic:  Alert & oriented, Normal motor function, Normal sensory function, No focal deficits noted.   Psychiatric:  Affect normal, " Judgment normal, Mood normal.     LAB:  All pertinent labs reviewed and interpreted.  Results for orders placed or performed during the hospital encounter of 09/08/21   CT Chest Pulmonary Embolism w Contrast    Impression    IMPRESSION:  1.  No evidence for pulmonary embolism. Negative CTA examination of the chest.   CT Head w/o Contrast    Impression    IMPRESSION:  1.  No acute intracranial hemorrhage.   Basic metabolic panel   Result Value Ref Range    Sodium 139 136 - 145 mmol/L    Potassium 4.2 3.5 - 5.0 mmol/L    Chloride 102 98 - 107 mmol/L    Carbon Dioxide (CO2) 27 22 - 31 mmol/L    Anion Gap 10 5 - 18 mmol/L    Urea Nitrogen 10 8 - 22 mg/dL    Creatinine 0.67 0.60 - 1.10 mg/dL    Calcium 9.8 8.5 - 10.5 mg/dL    Glucose 94 70 - 125 mg/dL    GFR Estimate >90 >60 mL/min/1.73m2   Result Value Ref Range    Troponin I <0.01 0.00 - 0.29 ng/mL   HCG qualitative Blood   Result Value Ref Range    hCG Serum Qualitative Negative Negative   CBC with platelets and differential   Result Value Ref Range    WBC Count 8.1 4.0 - 11.0 10e3/uL    RBC Count 4.68 3.80 - 5.20 10e6/uL    Hemoglobin 13.4 11.7 - 15.7 g/dL    Hematocrit 41.5 35.0 - 47.0 %    MCV 89 78 - 100 fL    MCH 28.6 26.5 - 33.0 pg    MCHC 32.3 31.5 - 36.5 g/dL    RDW 12.2 10.0 - 15.0 %    Platelet Count 243 150 - 450 10e3/uL    % Neutrophils 51 %    % Lymphocytes 40 %    % Monocytes 6 %    % Eosinophils 2 %    % Basophils 1 %    % Immature Granulocytes 0 %    NRBCs per 100 WBC 0 <1 /100    Absolute Neutrophils 4.2 1.6 - 8.3 10e3/uL    Absolute Lymphocytes 3.3 0.8 - 5.3 10e3/uL    Absolute Monocytes 0.5 0.0 - 1.3 10e3/uL    Absolute Eosinophils 0.2 0.0 - 0.7 10e3/uL    Absolute Basophils 0.0 0.0 - 0.2 10e3/uL    Absolute Immature Granulocytes 0.0 <=0.0 10e3/uL    Absolute NRBCs 0.0 10e3/uL   ECG 12-LEAD WITH MUSE (LHE)   Result Value Ref Range    Systolic Blood Pressure  mmHg    Diastolic Blood Pressure  mmHg    Ventricular Rate 59 BPM    Atrial Rate 59 BPM     NC Interval 142 ms    QRS Duration 84 ms     ms    QTc 417 ms    P Axis 44 degrees    R AXIS 58 degrees    T Axis 49 degrees    Interpretation ECG       Sinus bradycardia  Otherwise normal ECG  When compared with ECG of 09-SEP-2019 08:03,  No significant change was found  Confirmed by SEE ED PROVIDER NOTE FOR, ECG INTERPRETATION ([id number]),  KATHY DUNCAN ([id number]) on 9/9/2021 12:19:11 AM         RADIOLOGY:  CT Chest Pulmonary Embolism w Contrast   Final Result   IMPRESSION:   1.  No evidence for pulmonary embolism. Negative CTA examination of the chest.      CT Head w/o Contrast   Final Result   IMPRESSION:   1.  No acute intracranial hemorrhage.             EKG:    Sinus bradycardia, no specific ST acute ischemic changes, no concerning dysrhythmias or and for prolongation, report from ECG of 9/9/2019 reported normal sinus rhythm but I cannot find imaging for comparison    I have independently reviewed and interpreted the EKG(s) documented above.    PROCEDURES:          I, Tiffanie Hanks, am serving as a scribe to document services personally performed by Ben Isabel MD, based on my observation and the provider's statements to me. I, Ben Isabel MD attest that Tiffanie Hanks is acting in a scribe capacity, has observed my performance of the services and has documented them in accordance with my direction.    Ben Isabel M.D.  Emergency Medicine  [facility] EMERGENCY DEPARTMENT  [address] [place] [id number]  [phone]  Dept: [phone]       Ben Isabel MD  09/09/21 0031

## 2021-10-17 ENCOUNTER — HEALTH MAINTENANCE LETTER (OUTPATIENT)
Age: 31
End: 2021-10-17

## 2021-12-01 ENCOUNTER — OFFICE VISIT (OUTPATIENT)
Dept: FAMILY MEDICINE | Facility: CLINIC | Age: 31
End: 2021-12-01
Payer: COMMERCIAL

## 2021-12-01 VITALS
SYSTOLIC BLOOD PRESSURE: 92 MMHG | DIASTOLIC BLOOD PRESSURE: 60 MMHG | HEART RATE: 68 BPM | WEIGHT: 125 LBS | BODY MASS INDEX: 22.14 KG/M2 | RESPIRATION RATE: 16 BRPM

## 2021-12-01 DIAGNOSIS — N92.6 IRREGULAR MENSES: ICD-10-CM

## 2021-12-01 DIAGNOSIS — R30.0 DYSURIA: Primary | ICD-10-CM

## 2021-12-01 LAB
ALBUMIN UR-MCNC: NEGATIVE MG/DL
APPEARANCE UR: CLEAR
BILIRUB UR QL STRIP: NEGATIVE
COLOR UR AUTO: YELLOW
GLUCOSE UR STRIP-MCNC: NEGATIVE MG/DL
HCG UR QL: NEGATIVE
HGB UR QL STRIP: ABNORMAL
KETONES UR STRIP-MCNC: NEGATIVE MG/DL
LEUKOCYTE ESTERASE UR QL STRIP: ABNORMAL
NITRATE UR QL: NEGATIVE
PH UR STRIP: 6 [PH] (ref 5–8)
SP GR UR STRIP: 1.02 (ref 1–1.03)
UROBILINOGEN UR STRIP-ACNC: 0.2 E.U./DL

## 2021-12-01 PROCEDURE — 99213 OFFICE O/P EST LOW 20 MIN: CPT | Performed by: PHYSICIAN ASSISTANT

## 2021-12-01 PROCEDURE — 87086 URINE CULTURE/COLONY COUNT: CPT | Performed by: PHYSICIAN ASSISTANT

## 2021-12-01 PROCEDURE — 81003 URINALYSIS AUTO W/O SCOPE: CPT | Performed by: PHYSICIAN ASSISTANT

## 2021-12-01 PROCEDURE — 81025 URINE PREGNANCY TEST: CPT | Performed by: PHYSICIAN ASSISTANT

## 2021-12-01 PROCEDURE — 87186 SC STD MICRODIL/AGAR DIL: CPT | Performed by: PHYSICIAN ASSISTANT

## 2021-12-01 RX ORDER — SULFAMETHOXAZOLE/TRIMETHOPRIM 800-160 MG
1 TABLET ORAL 2 TIMES DAILY
Qty: 6 TABLET | Refills: 0 | Status: SHIPPED | OUTPATIENT
Start: 2021-12-01 | End: 2021-12-04

## 2021-12-01 NOTE — PROGRESS NOTES
Chief Complaint:  chief complaint    HPI:   Neeraj Cid is a 31 year old female with chief complaint of possible UTI.  She has been having symptoms for 5 days.  Dysuria, urinary urgency of the only being able to urinate a small amount.  No blood in her urine.  No fevers.  She took Azo and that did help symptoms for a few days, but they returned.  She has had a UTI in the past, she thinks probably 3 months ago.  Symptoms now are similar to what they were then.  Not using anything for contraception.  LMP was in October.  Negative home pregnancy test 2 days ago.  She got her flu shot at work the 1st week of November.    Patient Active Problem List   Diagnosis   (none) - all problems resolved or deleted       Current Outpatient Medications:      acetaminophen (TYLENOL) 325 MG tablet, [ACETAMINOPHEN (TYLENOL) 325 MG TABLET] Take 1-2 tablets (325-650 mg total) by mouth every 4 (four) hours as needed., Disp: 90 tablet, Rfl: 0     docusate sodium (COLACE) 100 MG capsule, [DOCUSATE SODIUM (COLACE) 100 MG CAPSULE] Take 1 capsule (100 mg total) by mouth daily., Disp: 90 capsule, Rfl: 0     ferrous sulfate 325 (65 FE) MG tablet, [FERROUS SULFATE 325 (65 FE) MG TABLET] Take 1 tablet (325 mg total) by mouth daily with breakfast., Disp: 90 tablet, Rfl: 3     ibuprofen (ADVIL,MOTRIN) 800 MG tablet, [IBUPROFEN (ADVIL,MOTRIN) 800 MG TABLET] Take 1 tablet (800 mg total) by mouth every 8 (eight) hours., Disp: 90 tablet, Rfl: 0     phenazopyridine (PYRIDIUM) 200 MG tablet, [PHENAZOPYRIDINE (PYRIDIUM) 200 MG TABLET] Take 1 tablet (200 mg total) by mouth 3 (three) times a day as needed for pain., Disp: 9 tablet, Rfl: 0     sulfamethoxazole-trimethoprim (BACTRIM DS) 800-160 MG tablet, Take 1 tablet by mouth 2 times daily for 3 days, Disp: 6 tablet, Rfl: 0    Objective:  Allergies:  No Known Allergies    Vitals:    12/01/21 0808   BP: 92/60   BP Location: Left arm   Patient Position: Sitting   Cuff Size: Adult Regular   Pulse: 68   Resp: 16    Weight: 56.7 kg (125 lb)     Body mass index is 22.14 kg/m .    Vital signs reviewed  General: Patient is alert and oriented x 3, in no apparent distress  Cardiac: Regular rate and rhythm, no murmurs  Lungs: Clear to auscultation bilaterally, No crackles, rales, rhonchi, or wheezing noted  Abdomen: Non tender to palpation, no hepatosplenomegaly, negative Hathaway's sign, no pain over McBurney's point, positive bowel sounds, no masses palpable, no CVA tenderness bilaterally    Results for orders placed or performed in visit on 12/01/21   UA Macro with Reflex to Micro and Culture - lab collect     Status: Abnormal    Specimen: Urine, Clean Catch   Result Value Ref Range    Color Urine Yellow Colorless, Straw, Light Yellow, Yellow    Appearance Urine Clear Clear    Glucose Urine Negative Negative mg/dL    Bilirubin Urine Negative Negative    Ketones Urine Negative Negative mg/dL    Specific Gravity Urine 1.025 1.005 - 1.030    Blood Urine Large (A) Negative    pH Urine 6.0 5.0 - 8.0    Protein Albumin Urine Negative Negative mg/dL    Urobilinogen Urine 0.2 0.2, 1.0 E.U./dL    Nitrite Urine Negative Negative    Leukocyte Esterase Urine Small (A) Negative     Urine culture is pending.    Assessment and Plan:  1. UTI  Prescription sent for Bactrim.  Discussed continued symptomatic treatment.  I will follow-up with urine culture.  Additionally, pregnancy test was ordered and will get that result prior to her starting antibiotic.  Given that she had a negative home test 2 days ago, I suspect pregnancy test will be negative.    This dictation uses voice recognition software, which may contain typographical errors.

## 2021-12-03 ENCOUNTER — TELEPHONE (OUTPATIENT)
Dept: FAMILY MEDICINE | Facility: CLINIC | Age: 31
End: 2021-12-03
Payer: COMMERCIAL

## 2021-12-03 LAB — BACTERIA UR CULT: ABNORMAL

## 2021-12-03 NOTE — TELEPHONE ENCOUNTER
Relayed message below from Debbie BARRIOS  Rx working for patient and no current UTI symptoms.  Patient has taken 2 doses of   sulfamethoxazole-trimethoprim (BACTRIM DS) 800-160 MG tablet    phenazopyridine (PYRIDIUM) 200 MG tablet      No further action needed    ----- Message from Debbie Mccann PA-C sent at 12/3/2021  7:01 AM CST -----  Are her urine symptoms getting better?  The antibiotic I gave her might not be strong enough to treat her infection, so if she's not getting better we should switch it to something else.      Vika Rodriguez RN  [facility]

## 2021-12-06 ENCOUNTER — TELEPHONE (OUTPATIENT)
Dept: FAMILY MEDICINE | Facility: CLINIC | Age: 31
End: 2021-12-06
Payer: COMMERCIAL

## 2021-12-06 DIAGNOSIS — N39.0 URINARY TRACT INFECTION WITHOUT HEMATURIA, SITE UNSPECIFIED: Primary | ICD-10-CM

## 2021-12-06 RX ORDER — NITROFURANTOIN 25; 75 MG/1; MG/1
100 CAPSULE ORAL 2 TIMES DAILY
Qty: 14 CAPSULE | Refills: 0 | Status: SHIPPED | OUTPATIENT
Start: 2021-12-06 | End: 2021-12-13

## 2021-12-06 NOTE — TELEPHONE ENCOUNTER
Pt stopped by  and want us to pass msg to provider that her symptoms had return. This in regards to the visit on 12/1/21 with provider. Pt say provider advise her to take meds and if not feeling getting better, provider will order new meds for pt. Pt is requesting to get that med order if possible. Please look into this request and order med if applicable. Per pt, please send rx to  [facility] on the [address] of [address].   Thank you.

## 2021-12-06 NOTE — TELEPHONE ENCOUNTER
Let pt know I sent a new antibiotic.  This should treat her infection better.  After she finishes this new antibiotic, if symptoms aren't better let me know.

## 2022-01-07 ENCOUNTER — APPOINTMENT (OUTPATIENT)
Dept: INTERPRETER SERVICES | Facility: CLINIC | Age: 32
End: 2022-01-07
Payer: COMMERCIAL

## 2022-01-17 ENCOUNTER — OFFICE VISIT (OUTPATIENT)
Dept: FAMILY MEDICINE | Facility: CLINIC | Age: 32
End: 2022-01-17
Payer: COMMERCIAL

## 2022-01-17 VITALS
TEMPERATURE: 97.6 F | RESPIRATION RATE: 12 BRPM | WEIGHT: 120 LBS | BODY MASS INDEX: 21.26 KG/M2 | SYSTOLIC BLOOD PRESSURE: 100 MMHG | HEART RATE: 71 BPM | DIASTOLIC BLOOD PRESSURE: 64 MMHG

## 2022-01-17 DIAGNOSIS — M54.50 ACUTE BILATERAL LOW BACK PAIN WITHOUT SCIATICA: Primary | ICD-10-CM

## 2022-01-17 PROCEDURE — 99213 OFFICE O/P EST LOW 20 MIN: CPT | Performed by: PHYSICIAN ASSISTANT

## 2022-01-17 NOTE — PROGRESS NOTES
Subjective:      Neeraj Cid is a 31 year old female with chief complaint of back pain.  She has had back pain off and on for 3 weeks.  No known injury.  She works in a  job.  Pain in the bilateral low back.  No radiating pain to the legs.  No dysuria or blood in her urine.  Pain seems to be the worst first thing in the morning.  She describes it as tight, pressure, achy.  She has not really treated it with anything yet.  She notes she did get her COVID booster and flu shot about 1 month ago at work.  I will enter approximate dates today.    Patient Active Problem List   Diagnosis   (none) - all problems resolved or deleted       Current Outpatient Medications:      acetaminophen (TYLENOL) 325 MG tablet, [ACETAMINOPHEN (TYLENOL) 325 MG TABLET] Take 1-2 tablets (325-650 mg total) by mouth every 4 (four) hours as needed., Disp: 90 tablet, Rfl: 0     docusate sodium (COLACE) 100 MG capsule, [DOCUSATE SODIUM (COLACE) 100 MG CAPSULE] Take 1 capsule (100 mg total) by mouth daily., Disp: 90 capsule, Rfl: 0     ferrous sulfate 325 (65 FE) MG tablet, [FERROUS SULFATE 325 (65 FE) MG TABLET] Take 1 tablet (325 mg total) by mouth daily with breakfast., Disp: 90 tablet, Rfl: 3     ibuprofen (ADVIL,MOTRIN) 800 MG tablet, [IBUPROFEN (ADVIL,MOTRIN) 800 MG TABLET] Take 1 tablet (800 mg total) by mouth every 8 (eight) hours., Disp: 90 tablet, Rfl: 0       Objective:     No Known Allergies  /64 (BP Location: Left arm, Patient Position: Sitting, Cuff Size: Adult Regular)   Pulse 71   Temp 97.6  F (36.4  C) (Temporal)   Resp 12   Wt 54.4 kg (120 lb)   LMP 12/17/2021 (Approximate)   BMI 21.26 kg/m    Body mass index is 21.26 kg/m .    Vitals reviewed as above.  General: Patient is alert and oriented x 3, in no apparent distress  Cardiac: Regular rate and rhythm, no murmurs  Pulmonary: lungs clear to ausculation, no crackles, rales, rhonchi, or wheezing  Musculoskeletal: normal 4/5 strength in major muscle  groups in lower extremities bilaterally, negative straight leg raise test bilaterally, able to flex to 80 degrees at the waist without pain, points to upper low back bilaterally as main areas of pain, no pain with direct palpation in these areas or SI joints        Assessment and Plan:     1. Acute bilateral low back pain without sciatica  Likely musculoskeletal in nature.  No red flags on exam or history.  Exam grossly normal today.  Reviewed continued symptomatic treatment with Tylenol and ibuprofen, Tiger balm.  Patient declines physical therapy referral.  Continue to monitor and follow-up as needed.      This dictation uses voice recognition software, which may contain typographical errors.

## 2022-03-22 ENCOUNTER — HOSPITAL ENCOUNTER (EMERGENCY)
Facility: HOSPITAL | Age: 32
Discharge: HOME | End: 2022-03-22
Admitting: PHYSICIAN ASSISTANT
Payer: COMMERCIAL

## 2022-03-22 VITALS
OXYGEN SATURATION: 99 % | RESPIRATION RATE: 18 BRPM | SYSTOLIC BLOOD PRESSURE: 100 MMHG | WEIGHT: 123 LBS | HEART RATE: 78 BPM | DIASTOLIC BLOOD PRESSURE: 68 MMHG | BODY MASS INDEX: 21.79 KG/M2 | TEMPERATURE: 98.2 F

## 2022-03-22 DIAGNOSIS — N39.0 URINARY TRACT INFECTION WITHOUT HEMATURIA, SITE UNSPECIFIED: ICD-10-CM

## 2022-03-22 LAB
ALBUMIN UR-MCNC: NEGATIVE MG/DL
APPEARANCE UR: CLEAR
BACTERIA #/AREA URNS HPF: ABNORMAL /HPF
BILIRUB UR QL STRIP: NEGATIVE
COLOR UR AUTO: YELLOW
GLUCOSE UR STRIP-MCNC: NEGATIVE MG/DL
HCG UR QL: NEGATIVE
HGB UR QL STRIP: ABNORMAL
INTERNAL QC OK POCT: NORMAL
KETONES UR STRIP-MCNC: NEGATIVE MG/DL
LEUKOCYTE ESTERASE UR QL STRIP: ABNORMAL
NITRATE UR QL: NEGATIVE
PH UR STRIP: 6 [PH] (ref 5–7)
POCT KIT EXPIRATION DATE: NORMAL
POCT KIT LOT NUMBER: NORMAL
RBC URINE: 1 /HPF
SP GR UR STRIP: 1 (ref 1–1.03)
SQUAMOUS EPITHELIAL: <1 /HPF
UROBILINOGEN UR STRIP-MCNC: <2 MG/DL
WBC URINE: 68 /HPF

## 2022-03-22 PROCEDURE — 99283 EMERGENCY DEPT VISIT LOW MDM: CPT

## 2022-03-22 PROCEDURE — 87086 URINE CULTURE/COLONY COUNT: CPT | Performed by: STUDENT IN AN ORGANIZED HEALTH CARE EDUCATION/TRAINING PROGRAM

## 2022-03-22 PROCEDURE — 81001 URINALYSIS AUTO W/SCOPE: CPT | Performed by: STUDENT IN AN ORGANIZED HEALTH CARE EDUCATION/TRAINING PROGRAM

## 2022-03-22 PROCEDURE — 81025 URINE PREGNANCY TEST: CPT | Performed by: STUDENT IN AN ORGANIZED HEALTH CARE EDUCATION/TRAINING PROGRAM

## 2022-03-22 RX ORDER — CEFDINIR 300 MG/1
300 CAPSULE ORAL 2 TIMES DAILY
Qty: 10 CAPSULE | Refills: 0 | Status: SHIPPED | OUTPATIENT
Start: 2022-03-22 | End: 2022-03-27

## 2022-03-22 ASSESSMENT — ENCOUNTER SYMPTOMS
NAUSEA: 0
HEMATURIA: 0
FREQUENCY: 1
ABDOMINAL PAIN: 0
CHILLS: 0
DIARRHEA: 0
DYSURIA: 1
VOMITING: 0
FLANK PAIN: 0
FEVER: 0

## 2022-03-22 NOTE — Clinical Note
Neeraj Cid was seen and treated in our emergency department on 3/22/2022.  She may return to work on 03/24/2022.       If you have any questions or concerns, please don't hesitate to call.      Anamaria Porras PA-C

## 2022-03-23 NOTE — ED PROVIDER NOTES
EMERGENCY DEPARTMENT ENCOUNTER      NAME: eNeraj Cid  AGE: 32 year old female  YOB: 1990  MRN: [id number]  EVALUATION DATE & TIME: No admission date for patient encounter.    PCP: Bernard العراقي    ED PROVIDER: Anamaria Porras PA-C      Chief Complaint   Patient presents with     Dysuria         FINAL IMPRESSION:  1. Urinary tract infection without hematuria, site unspecified          MEDICAL DECISION MAKING:    Pertinent Labs & Imaging studies reviewed. (See chart for details)  32 year old female presents to the Emergency Department for evaluation of dysuria and frequency x 3 days. Denies hematuria, fevers, chills, vomiting, abdominal pain, flank pain, vaginal bleeding or discharge. Has been using azo with some relief. Had UTI in December 2021 with similar symptoms.     Vitals reviewed and unremarkable. Afebrile. Well appearing and in no acute distress. On exam, abdomen soft and non-tender. No flank or CVA tenderness. Differential diagnosis includes but not limited to UTI, pyelonephritis, ureterolithiasis, STI.    This patient presents with symptoms consistent with acute uncomplicated cystitis. No systemic symptoms. Not septic. Well appearing. Low suspicion for acute pyelonephritis given lack of fever, CVAT, or systemic features. Low suspicion for kidney stone or infected stone. UPT negative so doubt ectopic pregnancy. Previous culture reviewed with resistance to ampicillin, ciprofloxacin, levofloxacin, and bactrim. Will start her on cefdinir with close PCP follow up. Urine culture pending. Discussed return precautions and all questions answered. Patient discharged home in stable condition.     0 minutes of critical care time     ED COURSE:  9:04 PM I met with the patient, obtained history, performed an initial exam, and discussed options and plan for diagnostics and treatment here in the ED.  9:28 PM Discussed results. Patient discharged after being provided with extensive anticipatory guidance and given  return precautions, importance of PCP follow-up emphasized.    At the conclusion of the encounter I discussed the results of all of the tests and the disposition. The questions were answered. The patient and family acknowledged understanding and were agreeable with the care plan.     MEDICATIONS GIVEN IN THE EMERGENCY:  Medications - No data to display    NEW PRESCRIPTIONS STARTED AT TODAY'S ER VISIT  Discharge Medication List as of 3/22/2022  9:36 PM      START taking these medications    Details   cefdinir (OMNICEF) 300 MG capsule Take 1 capsule (300 mg) by mouth 2 times daily for 5 days, Disp-10 capsule, R-0, E-Prescribe                  =================================================================    HPI:    Patient information was obtained from: Patient    Use of Interpretor: N/A      Neeraj Cid is a 32 year old female with a pertinent history of H pylori, UTI who presents to this ED via private vehicle for evaluation of painful urination.     Per chart review: UC from 21 grew e coli resistant to ampicillin, ciprofloxacin, levofloxacin, and bactrim.     Patient reports dysuria and urinary frequency since  (3 days). Has had UTIs in the past. Feels similar. Denies hematuria, fevers, chills, vomiting, abdominal pain, flank pain, vaginal bleeding or discharge. Has been using azo which does help temporarily.     REVIEW OF SYSTEMS:  Review of Systems   Constitutional: Negative for chills and fever.   Gastrointestinal: Negative for abdominal pain, diarrhea, nausea and vomiting.   Genitourinary: Positive for dysuria and frequency. Negative for flank pain, hematuria, vaginal bleeding and vaginal discharge.   All other systems reviewed and are negative.       PAST MEDICAL HISTORY:  Past Medical History:   Diagnosis Date     Dermoid cyst 2017    surgically removed      (normal spontaneous vaginal delivery)      Positive H. pylori test 2017     Skin tag of perianal region 2014    From faulty  second degree midline laceration repair after last delivery. Repaired 4/20.     Urinary tract infection        PAST SURGICAL HISTORY:  Past Surgical History:   Procedure Laterality Date     LAPAROSCOPIC CYSTECTOMY OVARIAN (ONCOLOGY)  03/2017    dermoid cyst removal           CURRENT MEDICATIONS:    No current facility-administered medications for this encounter.    Current Outpatient Medications:      cefdinir (OMNICEF) 300 MG capsule, Take 1 capsule (300 mg) by mouth 2 times daily for 5 days, Disp: 10 capsule, Rfl: 0     acetaminophen (TYLENOL) 325 MG tablet, [ACETAMINOPHEN (TYLENOL) 325 MG TABLET] Take 1-2 tablets (325-650 mg total) by mouth every 4 (four) hours as needed., Disp: 90 tablet, Rfl: 0     docusate sodium (COLACE) 100 MG capsule, [DOCUSATE SODIUM (COLACE) 100 MG CAPSULE] Take 1 capsule (100 mg total) by mouth daily., Disp: 90 capsule, Rfl: 0     ferrous sulfate 325 (65 FE) MG tablet, [FERROUS SULFATE 325 (65 FE) MG TABLET] Take 1 tablet (325 mg total) by mouth daily with breakfast., Disp: 90 tablet, Rfl: 3     ibuprofen (ADVIL,MOTRIN) 800 MG tablet, [IBUPROFEN (ADVIL,MOTRIN) 800 MG TABLET] Take 1 tablet (800 mg total) by mouth every 8 (eight) hours., Disp: 90 tablet, Rfl: 0      ALLERGIES:  No Known Allergies    FAMILY HISTORY:  Family History   Problem Relation Age of Onset     No Known Problems Mother      No Known Problems Father      No Known Problems Sister      No Known Problems Brother      No Known Problems Brother        SOCIAL HISTORY:   Social History     Socioeconomic History     Marital status:      Spouse name: Not on file     Number of children: Not on file     Years of education: Not on file     Highest education level: Not on file   Occupational History     Not on file   Tobacco Use     Smoking status: Never Smoker     Smokeless tobacco: Never Used   Substance and Sexual Activity     Alcohol use: Never     Drug use: Never     Sexual activity: Yes     Partners: Male   Other  Topics Concern     Not on file   Social History Narrative    Lives with  (Sheila), in-laws and 2 children     Social Determinants of Health     Financial Resource Strain: Not on file   Food Insecurity: Not on file   Transportation Needs: Not on file   Physical Activity: Not on file   Stress: Not on file   Social Connections: Not on file   Intimate Partner Violence: Not on file   Housing Stability: Not on file       VITALS:  Patient Vitals for the past 24 hrs:   BP Temp Temp src Pulse Resp SpO2 Weight   03/22/22 2200 100/68 98.2  F (36.8  C) Oral 78 18 99 % --   03/22/22 2004 114/67 97.6  F (36.4  C) Oral 74 20 100 % 55.8 kg (123 lb)       PHYSICAL EXAM   Constitutional: Well developed, Well nourished, NAD  HENT: Normocephalic, Atraumatic, Bilateral external ears normal, wearing mask in light of covid-19 pandemic.  Neck: Supple, No stridor.  Eyes: Conjunctiva normal, No discharge.   Respiratory: Normal breath sounds, No respiratory distress, No wheezing, Speaks full sentences easily. No cough.  Cardiovascular: Normal heart rate, Regular rhythm, No murmurs, No rubs, No gallops. Chest wall nontender.  GI: Soft, No tenderness, No masses, No flank or CVA tenderness. No rebound or guarding.  Musculoskeletal: No edema. No cyanosis, No clubbing. Good range of motion in all major joints.  Integument: Warm, Dry, No erythema, No rash. No petechiae.  Neurologic: Alert & oriented x 3, Normal motor function, Normal sensory function, No focal deficits noted. Normal gait.  Psychiatric: Affect normal, Judgment normal, Mood normal. Cooperative.    LAB:  All pertinent labs reviewed and interpreted.  Recent Results (from the past 24 hour(s))   UA with Microscopic reflex to Culture    Collection Time: 03/22/22  8:18 PM    Specimen: Urine, Midstream   Result Value Ref Range    Color Urine Yellow Colorless, Straw, Light Yellow, Yellow    Appearance Urine Clear Clear    Glucose Urine Negative Negative mg/dL    Bilirubin Urine Negative  Negative    Ketones Urine Negative Negative mg/dL    Specific Gravity Urine 1.005 1.001 - 1.030    Blood Urine 0.1 mg/dL (A) Negative    pH Urine 6.0 5.0 - 7.0    Protein Albumin Urine Negative Negative mg/dL    Urobilinogen Urine <2.0 <2.0 mg/dL    Nitrite Urine Negative Negative    Leukocyte Esterase Urine 250 Jeni/uL (A) Negative    Bacteria Urine Few (A) None Seen /HPF    RBC Urine 1 <=2 /HPF    WBC Urine 68 (H) <=5 /HPF    Squamous Epithelials Urine <1 <=1 /HPF   HCG qualitative urine POCT    Collection Time: 03/22/22  8:30 PM   Result Value Ref Range    HCG Qual Urine Negative Negative    Internal QC Check POCT Valid Valid    POCT Kit Lot Number [id number]     POCT Kit Expiration Date 03/31/2023            Anamaria Proras PA-C  Emergency Medicine  [facility]  3/22/2022       Anamaria Porras PA-C  03/24/22 0941

## 2022-03-23 NOTE — DISCHARGE INSTRUCTIONS
Your urine does appear infected.  We are going to start you on an antibiotic.  Follow-up with your primary care provider in 1 week.  Return with any new or worsening symptoms including abdominal pain, back pain, fevers, vomiting

## 2022-03-23 NOTE — ED TRIAGE NOTES
Patient arrives to triage from work with chief complaint of pain with urination which started on Sunday.  Patient was placed on Bactrim and Nitrofurantoin back in December 2021 for similar complaint.  Patient has appointment in clinic for April, but pain too bad.  Patient is alert and oriented x4.

## 2022-03-24 LAB — BACTERIA UR CULT: NO GROWTH

## 2022-03-31 ENCOUNTER — MEDICAL CORRESPONDENCE (OUTPATIENT)
Dept: HEALTH INFORMATION MANAGEMENT | Facility: CLINIC | Age: 32
End: 2022-03-31
Payer: COMMERCIAL

## 2022-04-01 ENCOUNTER — TRANSCRIBE ORDERS (OUTPATIENT)
Dept: OTHER | Age: 32
End: 2022-04-01
Payer: COMMERCIAL

## 2022-04-01 DIAGNOSIS — R31.9 HEMATURIA, UNSPECIFIED: Primary | ICD-10-CM

## 2022-04-07 ENCOUNTER — PRE VISIT (OUTPATIENT)
Dept: UROLOGY | Facility: CLINIC | Age: 32
End: 2022-04-07
Payer: COMMERCIAL

## 2022-05-09 ENCOUNTER — PRE VISIT (OUTPATIENT)
Dept: UROLOGY | Facility: CLINIC | Age: 32
End: 2022-05-09
Payer: COMMERCIAL

## 2022-05-09 NOTE — CONFIDENTIAL NOTE
Reason for visit: consult     Relevant information: hematuria    Records/imaging/labs/orders: in epic    Pt called: n/a    At Rooming: collect urine    Shayy Ramírez  5/9/2022  8:50 AM

## 2022-07-23 ENCOUNTER — HEALTH MAINTENANCE LETTER (OUTPATIENT)
Age: 32
End: 2022-07-23

## 2022-10-01 ENCOUNTER — HEALTH MAINTENANCE LETTER (OUTPATIENT)
Age: 32
End: 2022-10-01

## 2023-07-13 NOTE — PROGRESS NOTES
Chief Complaint:  Chief Complaint   Patient presents with     PREGANCY INTAKE     HPI:   Neeraj Cid is a 29 y.o. female is here for pregnancy intake.  She is .  Patient's last menstrual period was 2019 (approximate).  Seen at the ER for chest/abdominal pain on 19.  Work-up negative.  Feeling better.  Some mild headaches.  She thinks she had a normal pap test a few years ago.    Past medical history: reviewed and updated.  Past Medical History:   Diagnosis Date     Dermoid cyst 2017    surgically removed     Urinary tract infection      Past Surgical History:   Procedure Laterality Date     LAPAROSCOPIC OVARIAN CYSTECTOMY  2017    dermoid cyst removal       Current Outpatient Medications:      prenatal vit-iron fum-folic ac (PRENATAL VITAMIN) 27 mg iron- 0.8 mg Tab tablet, Take 1 tablet by mouth once daily., Disp: 100 tablet, Rfl: 3    Social:  Social History     Tobacco Use     Smoking status: Never Smoker     Smokeless tobacco: Never Used   Substance Use Topics     Alcohol use: Never     Frequency: Never     Drug use: Never       OBJECTIVE:  No Known Allergies  Vitals:    10/01/19 0903   BP: 98/58   Pulse: 68   Resp: 16     Body mass index is 23.36 kg/m .    Vital signs stable as recorded above  General: Patient is alert and oriented x 3, in no apparent distress  Fetal Exam: Fundal height was not palpable, fetal heart tones are heard at 145 bpm.    Results:  Results for orders placed or performed in visit on 10/01/19   Culture, Urine   Result Value Ref Range    Culture No Growth    Pregnancy (Beta-hCG, Qual), Urine   Result Value Ref Range    Pregnancy Test, Urine Positive (!) Negative   ABO/RH Typing (OP order)   Result Value Ref Range    HML ABO/Rh Typing O POS     HML ABO/Rh Repeat Typing O POS    Hepatitis B Surface antigen (HBsAG)   Result Value Ref Range    Hepatitis B Surface Ag Negative Negative   HIV Antigen/Antibody Screening Cascade   Result Value Ref Range    HIV Antigen /  Antibody Negative Negative   HML Antibody Screen   Result Value Ref Range    HML Antibody Screen Negative Negative   Lead, Blood   Result Value Ref Range    Lead      Collection Method Venous    RPR   Result Value Ref Range    Treponema Antibody (Syphilis) Negative Negative   Rubella Immune Status (IgG)   Result Value Ref Range    Rubella Antibody, IgG Positive    Drugs of Abuse 1,Urine   Result Value Ref Range    Amphetamines Screen Negative Screen Negative    Benzodiazepines Screen Negative Screen Negative    Opiates Screen Negative Screen Negative    Phencyclidine Screen Negative Screen Negative    THC Screen Negative Screen Negative    Barbiturates Screen Negative Screen Negative    Cocaine Metabolite Screen Negative Screen Negative    Oxycodone Screen Negative Screen Negative    Creatinine, Urine 126.3 mg/dL   Urinalysis, OB Screen   Result Value Ref Range    Glucose, UA Negative Negative    Ketones, UA Negative Negative    Protein, UA Negative Negative mg/dL   HM1 (CBC with Diff)   Result Value Ref Range    WBC 9.2 4.0 - 11.0 thou/uL    RBC 4.04 3.80 - 5.40 mill/uL    Hemoglobin 11.7 (L) 12.0 - 16.0 g/dL    Hematocrit 36.6 35.0 - 47.0 %    MCV 91 80 - 100 fL    MCH 29.0 27.0 - 34.0 pg    MCHC 32.0 32.0 - 36.0 g/dL    RDW 12.8 11.0 - 14.5 %    Platelets 248 140 - 440 thou/uL    MPV 10.8 8.5 - 12.5 fL    Neutrophils % 71 (H) 50 - 70 %    Lymphocytes % 22 20 - 40 %    Monocytes % 5 2 - 10 %    Eosinophils % 1 0 - 6 %    Basophils % 0 0 - 2 %    Neutrophils Absolute 6.5 2.0 - 7.7 thou/uL    Lymphocytes Absolute 2.0 0.8 - 4.4 thou/uL    Monocytes Absolute 0.5 0.0 - 0.9 thou/uL    Eosinophils Absolute 0.1 0.0 - 0.4 thou/uL    Basophils Absolute 0.0 0.0 - 0.2 thou/uL   Lead, Blood, Venouos   Result Value Ref Range    Lead, Blood (Venous) <2.0 0.0 - 4.9 ug/dL     Other screening pregnancy lab work is ordered and pending.    Patient scored a 3/30 on [facility]  Depression Screen.    Assessment and Plan:  1.  Pregnancy Intake Appointment.  Neeraj Cid is 29 y.o. and .  Patient's last menstrual period was 2019 (approximate).  Estimated Date of Delivery: 20  She will be seeing Dr. Nelson for OB care.  Screening pregnancy lab work was drawn.  Prenatal vitamins prescribed.  Problem list and current medications reviewed and updated.  Dating ultrasound ordered.  Prenatal info packet given.  First trimester genetic screening test was discussed with patient.    She is undecided regarding Nuchal translucency ultrasound.    She will let us know if she wants this done.    Follow up in 3 weeks.  Please see OB Episode for complete details.  Visit was approximately 40 minutes, greater than 50% of time spent in face-to-face counseling and coordination of care.    This dictation uses voice recognition software, which may contain typographical errors.   No

## 2023-08-12 ENCOUNTER — HEALTH MAINTENANCE LETTER (OUTPATIENT)
Age: 33
End: 2023-08-12

## 2024-03-13 NOTE — TELEPHONE ENCOUNTER
Spoke with patient today. Patient says her employer is still operating because she works in the [employer]. She has been off work since 03/26/2020 but still needs this letter per her employer's request. Employer's phone number is [phone], please send letter via fax to employer. Please advise.   defibrillator left side/normal

## 2024-09-13 ENCOUNTER — APPOINTMENT (OUTPATIENT)
Dept: CT IMAGING | Facility: HOSPITAL | Age: 34
End: 2024-09-13
Attending: EMERGENCY MEDICINE
Payer: COMMERCIAL

## 2024-09-13 ENCOUNTER — APPOINTMENT (OUTPATIENT)
Dept: ULTRASOUND IMAGING | Facility: HOSPITAL | Age: 34
End: 2024-09-13
Attending: EMERGENCY MEDICINE
Payer: COMMERCIAL

## 2024-09-13 ENCOUNTER — HOSPITAL ENCOUNTER (EMERGENCY)
Facility: HOSPITAL | Age: 34
Discharge: HOME | End: 2024-09-13
Attending: EMERGENCY MEDICINE | Admitting: EMERGENCY MEDICINE
Payer: COMMERCIAL

## 2024-09-13 VITALS
HEART RATE: 66 BPM | SYSTOLIC BLOOD PRESSURE: 110 MMHG | DIASTOLIC BLOOD PRESSURE: 83 MMHG | BODY MASS INDEX: 20.85 KG/M2 | OXYGEN SATURATION: 100 % | RESPIRATION RATE: 16 BRPM | WEIGHT: 117.7 LBS | TEMPERATURE: 97.6 F

## 2024-09-13 DIAGNOSIS — R10.10 UPPER ABDOMINAL PAIN: ICD-10-CM

## 2024-09-13 LAB
ALBUMIN SERPL BCG-MCNC: 4 G/DL (ref 3.5–5.2)
ALP SERPL-CCNC: 61 U/L (ref 40–150)
ALT SERPL W P-5'-P-CCNC: 28 U/L (ref 0–50)
ANION GAP SERPL CALCULATED.3IONS-SCNC: 9 MMOL/L (ref 7–15)
AST SERPL W P-5'-P-CCNC: 23 U/L (ref 0–45)
BASOPHILS # BLD AUTO: 0 10E3/UL (ref 0–0.2)
BASOPHILS NFR BLD AUTO: 0 %
BILIRUB DIRECT SERPL-MCNC: <0.2 MG/DL (ref 0–0.3)
BILIRUB SERPL-MCNC: 0.8 MG/DL
BUN SERPL-MCNC: 6.4 MG/DL (ref 6–20)
CALCIUM SERPL-MCNC: 8.5 MG/DL (ref 8.8–10.4)
CHLORIDE SERPL-SCNC: 99 MMOL/L (ref 98–107)
CREAT SERPL-MCNC: 0.45 MG/DL (ref 0.51–0.95)
EGFRCR SERPLBLD CKD-EPI 2021: >90 ML/MIN/1.73M2
EOSINOPHIL # BLD AUTO: 0.2 10E3/UL (ref 0–0.7)
EOSINOPHIL NFR BLD AUTO: 2 %
ERYTHROCYTE [DISTWIDTH] IN BLOOD BY AUTOMATED COUNT: 12.3 % (ref 10–15)
GLUCOSE SERPL-MCNC: 109 MG/DL (ref 70–99)
HCO3 SERPL-SCNC: 28 MMOL/L (ref 22–29)
HCT VFR BLD AUTO: 36.7 % (ref 35–47)
HGB BLD-MCNC: 12 G/DL (ref 11.7–15.7)
IMM GRANULOCYTES # BLD: 0 10E3/UL
IMM GRANULOCYTES NFR BLD: 0 %
LIPASE SERPL-CCNC: 19 U/L (ref 13–60)
LYMPHOCYTES # BLD AUTO: 0.9 10E3/UL (ref 0.8–5.3)
LYMPHOCYTES NFR BLD AUTO: 13 %
MCH RBC QN AUTO: 29.4 PG (ref 26.5–33)
MCHC RBC AUTO-ENTMCNC: 32.7 G/DL (ref 31.5–36.5)
MCV RBC AUTO: 90 FL (ref 78–100)
MONOCYTES # BLD AUTO: 0.3 10E3/UL (ref 0–1.3)
MONOCYTES NFR BLD AUTO: 4 %
NEUTROPHILS # BLD AUTO: 5.6 10E3/UL (ref 1.6–8.3)
NEUTROPHILS NFR BLD AUTO: 80 %
NRBC # BLD AUTO: 0 10E3/UL
NRBC BLD AUTO-RTO: 0 /100
PLATELET # BLD AUTO: 164 10E3/UL (ref 150–450)
POTASSIUM SERPL-SCNC: 3.4 MMOL/L (ref 3.4–5.3)
PROT SERPL-MCNC: 7.1 G/DL (ref 6.4–8.3)
RBC # BLD AUTO: 4.08 10E6/UL (ref 3.8–5.2)
SODIUM SERPL-SCNC: 136 MMOL/L (ref 135–145)
WBC # BLD AUTO: 7.1 10E3/UL (ref 4–11)

## 2024-09-13 PROCEDURE — 250N000009 HC RX 250: Performed by: EMERGENCY MEDICINE

## 2024-09-13 PROCEDURE — 96375 TX/PRO/DX INJ NEW DRUG ADDON: CPT

## 2024-09-13 PROCEDURE — 83690 ASSAY OF LIPASE: CPT | Performed by: EMERGENCY MEDICINE

## 2024-09-13 PROCEDURE — 85025 COMPLETE CBC W/AUTO DIFF WBC: CPT | Performed by: EMERGENCY MEDICINE

## 2024-09-13 PROCEDURE — 250N000011 HC RX IP 250 OP 636: Mod: JZ | Performed by: EMERGENCY MEDICINE

## 2024-09-13 PROCEDURE — 76705 ECHO EXAM OF ABDOMEN: CPT

## 2024-09-13 PROCEDURE — 258N000003 HC RX IP 258 OP 636: Performed by: EMERGENCY MEDICINE

## 2024-09-13 PROCEDURE — 96374 THER/PROPH/DIAG INJ IV PUSH: CPT | Mod: 59

## 2024-09-13 PROCEDURE — 250N000013 HC RX MED GY IP 250 OP 250 PS 637: Performed by: EMERGENCY MEDICINE

## 2024-09-13 PROCEDURE — 36415 COLL VENOUS BLD VENIPUNCTURE: CPT | Performed by: EMERGENCY MEDICINE

## 2024-09-13 PROCEDURE — 99285 EMERGENCY DEPT VISIT HI MDM: CPT | Mod: 25

## 2024-09-13 PROCEDURE — 74177 CT ABD & PELVIS W/CONTRAST: CPT

## 2024-09-13 PROCEDURE — 250N000011 HC RX IP 250 OP 636: Performed by: EMERGENCY MEDICINE

## 2024-09-13 PROCEDURE — 82248 BILIRUBIN DIRECT: CPT | Performed by: EMERGENCY MEDICINE

## 2024-09-13 RX ORDER — FAMOTIDINE 20 MG/1
20 TABLET, FILM COATED ORAL 2 TIMES DAILY
Qty: 30 TABLET | Refills: 0 | Status: SHIPPED | OUTPATIENT
Start: 2024-09-13

## 2024-09-13 RX ORDER — LIDOCAINE HYDROCHLORIDE 20 MG/ML
10 SOLUTION OROPHARYNGEAL ONCE
Status: COMPLETED | OUTPATIENT
Start: 2024-09-13 | End: 2024-09-13

## 2024-09-13 RX ORDER — MAGNESIUM HYDROXIDE/ALUMINUM HYDROXICE/SIMETHICONE 120; 1200; 1200 MG/30ML; MG/30ML; MG/30ML
15 SUSPENSION ORAL ONCE
Status: COMPLETED | OUTPATIENT
Start: 2024-09-13 | End: 2024-09-13

## 2024-09-13 RX ORDER — IOPAMIDOL 755 MG/ML
58 INJECTION, SOLUTION INTRAVASCULAR ONCE
Status: COMPLETED | OUTPATIENT
Start: 2024-09-13 | End: 2024-09-13

## 2024-09-13 RX ORDER — ONDANSETRON 4 MG/1
4 TABLET, ORALLY DISINTEGRATING ORAL EVERY 8 HOURS PRN
Qty: 12 TABLET | Refills: 0 | Status: SHIPPED | OUTPATIENT
Start: 2024-09-13 | End: 2024-09-17

## 2024-09-13 RX ORDER — ONDANSETRON 2 MG/ML
4 INJECTION INTRAMUSCULAR; INTRAVENOUS ONCE
Status: COMPLETED | OUTPATIENT
Start: 2024-09-13 | End: 2024-09-13

## 2024-09-13 RX ORDER — MORPHINE SULFATE 4 MG/ML
4 INJECTION, SOLUTION INTRAMUSCULAR; INTRAVENOUS ONCE
Status: COMPLETED | OUTPATIENT
Start: 2024-09-13 | End: 2024-09-13

## 2024-09-13 RX ADMIN — LIDOCAINE HYDROCHLORIDE 10 ML: 20 SOLUTION ORAL; TOPICAL at 11:52

## 2024-09-13 RX ADMIN — ONDANSETRON 4 MG: 2 INJECTION INTRAMUSCULAR; INTRAVENOUS at 11:47

## 2024-09-13 RX ADMIN — SODIUM CHLORIDE 1000 ML: 9 INJECTION, SOLUTION INTRAVENOUS at 11:45

## 2024-09-13 RX ADMIN — MORPHINE SULFATE 4 MG: 4 INJECTION, SOLUTION INTRAMUSCULAR; INTRAVENOUS at 06:43

## 2024-09-13 RX ADMIN — ALUMINUM HYDROXIDE, MAGNESIUM HYDROXIDE, AND DIMETHICONE 15 ML: 200; 20; 200 SUSPENSION ORAL at 11:51

## 2024-09-13 RX ADMIN — IOPAMIDOL 58 ML: 755 INJECTION, SOLUTION INTRAVENOUS at 10:35

## 2024-09-13 ASSESSMENT — COLUMBIA-SUICIDE SEVERITY RATING SCALE - C-SSRS
2. HAVE YOU ACTUALLY HAD ANY THOUGHTS OF KILLING YOURSELF IN THE PAST MONTH?: NO
6. HAVE YOU EVER DONE ANYTHING, STARTED TO DO ANYTHING, OR PREPARED TO DO ANYTHING TO END YOUR LIFE?: NO
1. IN THE PAST MONTH, HAVE YOU WISHED YOU WERE DEAD OR WISHED YOU COULD GO TO SLEEP AND NOT WAKE UP?: NO

## 2024-09-13 ASSESSMENT — ACTIVITIES OF DAILY LIVING (ADL)
ADLS_ACUITY_SCORE: 35

## 2024-09-13 NOTE — Clinical Note
Neeraj Cid was seen and treated in our emergency department on 9/13/2024.  She may return to work on 09/16/2024.       If you have any questions or concerns, please don't hesitate to call.      Deng Florence, DO

## 2024-09-13 NOTE — ED PROVIDER NOTES
EMERGENCY DEPARTMENT ENCOUNTER      NAME: Neeraj Cid  AGE: 34 year old female  YOB: 1990  MRN: [id number]  EVALUATION DATE & TIME: 2024  6:10 AM    PCP: Bernard العراقي    ED PROVIDER: Deng Florence D.O.      Chief Complaint   Patient presents with    Abdominal Pain    Back Pain       FINAL IMPRESSION:  1. Upper abdominal pain        ED COURSE & MEDICAL DECISION MAKIN:17 AM I met with the patient to gather history and to perform my initial exam. I discussed the plan for care while in the Emergency Department.  11:25 AM I reassessed and reevaluated the patient while updating on results.   12:25 PM I checked on the patient and updated on final results. She tells me she is feeling improved and is ready to go home. We discussed plans for discharge and patient is agreeable. All questions were answered and return precautions discussed. Patient will be discharged by RN.            Pertinent Labs & Imaging studies reviewed. (See chart for details)  34 year old female presents to the Emergency Department for evaluation of upper abdominal pain.  Patient's pain was primarily in the right upper quadrant on my exam.  Initial differentials include gastritis, cholelithiasis, cholecystitis, choledocholithiasis, bowel obstruction, pancreatitis, other acute process.  Right upper quadrant ultrasound does not show any evidence of acute pathology, lab testing was largely unremarkable on this patient.  Because the patient's pain I did decide to perform a CT scan of the abdomen.  CT scan does show some pelvic venous congestion, but I do not believe is related to her symptoms, believe it to be an incidental finding but the patient is aware of it, as well as no other obvious acute process.  There is no findings that could explain patient's symptoms.  After GI cocktail the patient did have some improvement of her symptoms, and she was able to tolerate oral intake in the emergency department.  At this time I do not see  indication for admission and believe this patient to be safely discharged home with outpatient follow-up with primary care provider.  Patient was comfortable with plan.  Return precautions were discussed    Medical Decision Making  Obtained supplemental history:Supplemental history obtained?: No  Reviewed external records: External records reviewed?: No  Care impacted by chronic illness:N/A  Care significantly affected by social determinants of health:N/A  Did you consider but not order tests?: Work up considered but not performed and documented in chart, if applicable  Did you interpret images independently?: Independent interpretation of ECG and images noted in documentation, when applicable.  Consultation discussion with other provider:Did you involve another provider (consultant, , pharmacy, etc.)?: No  Discharge. I prescribed additional prescription strength medication(s) as charted. See documentation for any additional details.  Not Applicable        At the conclusion of the encounter I discussed the results of all of the tests and the disposition. The questions were answered. The patient or family acknowledged understanding and was agreeable with the care plan.        HPI    Patient information was obtained from: the patient.     Use of : Yes (Phone) - Language Jefferson Cid is a 34 year old female who presents with sudden onset upper, centralized abdominal pain around 3 AM this morning (~3.5 hours ago). She reports going to use the restroom and drank some water which seemed to make her pain worse, prompting her to come in. She endorses the pain to radiate down her abdomen and into her back. Denies any similar past episodes of pain. No vomiting, diarrhea or fevers.       PAST MEDICAL HISTORY:  Past Medical History:   Diagnosis Date    Dermoid cyst 2017    surgically removed     (normal spontaneous vaginal delivery)     Positive H. pylori test 2017    Skin tag of perianal  region 8/20/2014    From faulty second degree midline laceration repair after last delivery. Repaired 4/20.    Urinary tract infection        PAST SURGICAL HISTORY:  Past Surgical History:   Procedure Laterality Date    LAPAROSCOPIC CYSTECTOMY OVARIAN (ONCOLOGY)  03/2017    dermoid cyst removal         CURRENT MEDICATIONS:    No current facility-administered medications for this encounter.     Current Outpatient Medications   Medication Sig Dispense Refill    famotidine (PEPCID) 20 MG tablet Take 1 tablet (20 mg) by mouth 2 times daily. 30 tablet 0    ondansetron (ZOFRAN ODT) 4 MG ODT tab Take 1 tablet (4 mg) by mouth every 8 hours as needed for nausea or vomiting. 12 tablet 0    acetaminophen (TYLENOL) 325 MG tablet [ACETAMINOPHEN (TYLENOL) 325 MG TABLET] Take 1-2 tablets (325-650 mg total) by mouth every 4 (four) hours as needed. 90 tablet 0    docusate sodium (COLACE) 100 MG capsule [DOCUSATE SODIUM (COLACE) 100 MG CAPSULE] Take 1 capsule (100 mg total) by mouth daily. 90 capsule 0    ferrous sulfate 325 (65 FE) MG tablet [FERROUS SULFATE 325 (65 FE) MG TABLET] Take 1 tablet (325 mg total) by mouth daily with breakfast. 90 tablet 3    ibuprofen (ADVIL,MOTRIN) 800 MG tablet [IBUPROFEN (ADVIL,MOTRIN) 800 MG TABLET] Take 1 tablet (800 mg total) by mouth every 8 (eight) hours. 90 tablet 0         ALLERGIES:  No Known Allergies    FAMILY HISTORY:  Family History   Problem Relation Age of Onset    No Known Problems Mother     No Known Problems Father     No Known Problems Sister     No Known Problems Brother     No Known Problems Brother        SOCIAL HISTORY:  Social History     Socioeconomic History    Marital status:    Tobacco Use    Smoking status: Never    Smokeless tobacco: Never   Substance and Sexual Activity    Alcohol use: Never    Drug use: Never    Sexual activity: Yes     Partners: Male   Social History Narrative    Lives with  (Sheila), in-laws and 2 children       VITALS:  Patient Vitals  for the past 24 hrs:   BP Temp Temp src Pulse Resp SpO2 Weight   09/13/24 1159 -- -- -- 67 -- 100 % --   09/13/24 1144 -- -- -- 65 -- 99 % --   09/13/24 1139 102/69 -- -- 68 -- 99 % --   09/13/24 1124 111/75 -- -- 69 -- 99 % --   09/13/24 1118 101/64 -- -- 69 -- 100 % --   09/13/24 1055 99/63 -- -- 75 -- 98 % --   09/13/24 1043 -- -- -- 67 -- 100 % --   09/13/24 1040 114/74 -- -- 73 -- 100 % --   09/13/24 1028 116/74 -- -- -- -- -- --   09/13/24 1013 111/72 -- -- 68 -- 97 % --   09/13/24 1007 -- -- -- 69 -- 99 % --   09/13/24 0943 102/73 -- -- 76 -- 99 % --   09/13/24 0930 106/71 -- -- 72 -- 98 % --   09/13/24 0907 107/72 -- -- 79 -- 99 % --   09/13/24 0830 97/62 -- -- 69 -- 98 % --   09/13/24 0745 97/61 -- -- 76 -- 98 % --   09/13/24 0715 115/83 -- -- 75 -- 100 % --   09/13/24 0603 105/79 98.3  F (36.8  C) Oral 80 16 99 % 53.4 kg (117 lb 11.2 oz)       PHYSICAL EXAM    VITAL SIGNS: /69   Pulse 67   Temp 98.3  F (36.8  C) (Oral)   Resp 16   Wt 53.4 kg (117 lb 11.2 oz)   SpO2 100%   BMI 20.85 kg/m      General Appearance: Well-appearing, well-nourished, no acute distress   Head:  Normocephalic, without obvious abnormality, atraumatic  Eyes:  PERRL, conjunctiva/corneas clear, EOM's intact,  ENT:  Lips, mucosa, and tongue normal, membranes are moist without pallor  Neck:  Normal ROM, symmetrical, trachea midline    Chest:  No tenderness or deformity, no crepitus  Cardio:  Regular rate and rhythm, no murmur, rub or gallop, 2+ pulses symmetric in all extremities  Pulm:  Clear to auscultation bilaterally, respirations unlabored,  Back:  ROM normal, no CVA tenderness, no spinal tenderness, no paraspinal tenderness  Abdomen:  Soft, Right upper quadrant tenderness with guarding. No rebound.  Musculoskeletal: Full ROM, no edema, no cyanosis, good ROM of major joints  Integument:  Warm, Dry, No erythema, No rash.    Neurologic:  Alert & oriented.  No focal deficits appreciated.    Psychiatric:  Affect normal,  Judgment normal, Mood normal.      LABS  Results for orders placed or performed during the hospital encounter of 09/13/24 (from the past 24 hour(s))   CBC with platelets + differential    Narrative    The following orders were created for panel order CBC with platelets + differential.  Procedure                               Abnormality         Status                     ---------                               -----------         ------                     CBC with platelets and d...[[id number]]                      Final result                 Please view results for these tests on the individual orders.   Basic metabolic panel   Result Value Ref Range    Sodium 136 135 - 145 mmol/L    Potassium 3.4 3.4 - 5.3 mmol/L    Chloride 99 98 - 107 mmol/L    Carbon Dioxide (CO2) 28 22 - 29 mmol/L    Anion Gap 9 7 - 15 mmol/L    Urea Nitrogen 6.4 6.0 - 20.0 mg/dL    Creatinine 0.45 (L) 0.51 - 0.95 mg/dL    GFR Estimate >90 >60 mL/min/1.73m2    Calcium 8.5 (L) 8.8 - 10.4 mg/dL    Glucose 109 (H) 70 - 99 mg/dL   Hepatic function panel   Result Value Ref Range    Protein Total 7.1 6.4 - 8.3 g/dL    Albumin 4.0 3.5 - 5.2 g/dL    Bilirubin Total 0.8 <=1.2 mg/dL    Alkaline Phosphatase 61 40 - 150 U/L    AST 23 0 - 45 U/L    ALT 28 0 - 50 U/L    Bilirubin Direct <0.20 0.00 - 0.30 mg/dL   Lipase   Result Value Ref Range    Lipase 19 13 - 60 U/L   CBC with platelets and differential   Result Value Ref Range    WBC Count 7.1 4.0 - 11.0 10e3/uL    RBC Count 4.08 3.80 - 5.20 10e6/uL    Hemoglobin 12.0 11.7 - 15.7 g/dL    Hematocrit 36.7 35.0 - 47.0 %    MCV 90 78 - 100 fL    MCH 29.4 26.5 - 33.0 pg    MCHC 32.7 31.5 - 36.5 g/dL    RDW 12.3 10.0 - 15.0 %    Platelet Count 164 150 - 450 10e3/uL    % Neutrophils 80 %    % Lymphocytes 13 %    % Monocytes 4 %    % Eosinophils 2 %    % Basophils 0 %    % Immature Granulocytes 0 %    NRBCs per 100 WBC 0 <1 /100    Absolute Neutrophils 5.6 1.6 - 8.3 10e3/uL    Absolute Lymphocytes 0.9 0.8 - 5.3  10e3/uL    Absolute Monocytes 0.3 0.0 - 1.3 10e3/uL    Absolute Eosinophils 0.2 0.0 - 0.7 10e3/uL    Absolute Basophils 0.0 0.0 - 0.2 10e3/uL    Absolute Immature Granulocytes 0.0 <=0.4 10e3/uL    Absolute NRBCs 0.0 10e3/uL   US Abdomen Limited    Narrative    EXAM: US ABDOMEN LIMITED  LOCATION: [facility]  DATE: 9/13/2024    INDICATION: RUQ abdominal pain  COMPARISON: None.  TECHNIQUE: Limited abdominal ultrasound.    FINDINGS:    GALLBLADDER: Sludge within the gallbladder, however no shadowing gallstones. No wall thickening or pericholecystic fluid. Negative sonographic Hathaway's.    BILE DUCTS: No biliary dilatation. The common duct measures 3 mm.    LIVER: Normal parenchyma with smooth contour. No focal mass.    RIGHT KIDNEY: No hydronephrosis.    PANCREAS: The visualized portions are normal.    No ascites.      Impression    IMPRESSION:  Sludge within the gallbladder, however no shadowing gallstones or sonographic findings of cholecystitis.   CT Abdomen Pelvis w Contrast    Narrative    EXAM: CT ABDOMEN PELVIS W CONTRAST  LOCATION: [facility]  DATE: 9/13/2024    INDICATION: Upper abdominal pain  COMPARISON: Abdominal ultrasound from earlier today, chest CT from 9/8/2021.  TECHNIQUE: CT scan of the abdomen and pelvis was performed following injection of IV contrast. Multiplanar reformats were obtained. Dose reduction techniques were used.  CONTRAST: IsoVue 370 58ml    FINDINGS:   LOWER CHEST: Mild bibasilar atelectasis.    HEPATOBILIARY: Small enhancing observation in the right posterior segment measuring 1.1 cm on series 3 image 34.    PANCREAS: Normal.    SPLEEN: Normal.    ADRENAL GLANDS: Normal.    KIDNEYS/BLADDER: No significant mass, stone, or hydronephrosis.    BOWEL: No obstruction or inflammatory change. Normal appendix.    LYMPH NODES: Normal.    VASCULATURE: No abdominal aortic aneurysm.    PELVIC ORGANS: Prominent bilateral parametrial  vessels.    MUSCULOSKELETAL: Normal.      Impression    IMPRESSION:   1.  No acute findings or CT evidence for source of symptoms.  2.  Prominent bilateral parametrial vessels are nonspecific but can be seen with pelvic congestion syndrome.  3.  Small enhancing observation in the right posterior segment of the liver may represent a small arterial portal shunt or flash filling hemangioma.         RADIOLOGY  CT Abdomen Pelvis w Contrast   Final Result   IMPRESSION:    1.  No acute findings or CT evidence for source of symptoms.   2.  Prominent bilateral parametrial vessels are nonspecific but can be seen with pelvic congestion syndrome.   3.  Small enhancing observation in the right posterior segment of the liver may represent a small arterial portal shunt or flash filling hemangioma.      US Abdomen Limited   Final Result   IMPRESSION:   Sludge within the gallbladder, however no shadowing gallstones or sonographic findings of cholecystitis.          MEDICATIONS GIVEN IN THE EMERGENCY:  Medications   morphine (PF) injection 4 mg (4 mg Intravenous $Given 9/13/24 0643)   iopamidol (ISOVUE-370) solution 58 mL (58 mLs Intravenous $Given 9/13/24 1035)   ondansetron (ZOFRAN) injection 4 mg (4 mg Intravenous $Given 9/13/24 1147)   alum & mag hydroxide-simethicone (MAALOX) suspension 15 mL (15 mLs Oral $Given 9/13/24 1151)   lidocaine (viscous) (XYLOCAINE) 2 % solution 10 mL (10 mLs Mouth/Throat $Given 9/13/24 1152)   sodium chloride 0.9% BOLUS 1,000 mL (0 mLs Intravenous Stopped 9/13/24 1215)       NEW PRESCRIPTIONS STARTED AT TODAY'S ER VISIT  New Prescriptions    FAMOTIDINE (PEPCID) 20 MG TABLET    Take 1 tablet (20 mg) by mouth 2 times daily.    ONDANSETRON (ZOFRAN ODT) 4 MG ODT TAB    Take 1 tablet (4 mg) by mouth every 8 hours as needed for nausea or vomiting.        I, Enoch Mcmanus, am serving as a scribe to document services personally performed by Deng Florence D.O., based on my observations and the provider's  statements to me.  I, Deng Florence D.O., attest that Enoch Mcmanus is acting in a scribe capacity, has observed my performance of the services and has documented them in accordance with my direction.     Deng Florence D.O.  Emergency Medicine  [facility] EMERGENCY DEPARTMENT  [address] [place] [id number]  [phone]  Dept: [phone]       Deng Florence,   09/13/24 3949

## 2024-09-13 NOTE — ED TRIAGE NOTES
Pt states diffuse abdominal pain starting tonight 9/10.  Denies N/V/D.  Pt also endorses upper back pain 8/10.

## 2024-09-29 ENCOUNTER — HEALTH MAINTENANCE LETTER (OUTPATIENT)
Age: 34
End: 2024-09-29

## 2025-04-29 PROCEDURE — 99285 EMERGENCY DEPT VISIT HI MDM: CPT | Mod: 25

## 2025-04-29 ASSESSMENT — COLUMBIA-SUICIDE SEVERITY RATING SCALE - C-SSRS
2. HAVE YOU ACTUALLY HAD ANY THOUGHTS OF KILLING YOURSELF IN THE PAST MONTH?: NO
1. IN THE PAST MONTH, HAVE YOU WISHED YOU WERE DEAD OR WISHED YOU COULD GO TO SLEEP AND NOT WAKE UP?: NO
6. HAVE YOU EVER DONE ANYTHING, STARTED TO DO ANYTHING, OR PREPARED TO DO ANYTHING TO END YOUR LIFE?: NO

## 2025-04-30 ENCOUNTER — APPOINTMENT (OUTPATIENT)
Dept: ULTRASOUND IMAGING | Facility: HOSPITAL | Age: 35
End: 2025-04-30
Attending: STUDENT IN AN ORGANIZED HEALTH CARE EDUCATION/TRAINING PROGRAM
Payer: COMMERCIAL

## 2025-04-30 ENCOUNTER — APPOINTMENT (OUTPATIENT)
Dept: CT IMAGING | Facility: HOSPITAL | Age: 35
End: 2025-04-30
Attending: STUDENT IN AN ORGANIZED HEALTH CARE EDUCATION/TRAINING PROGRAM
Payer: COMMERCIAL

## 2025-04-30 ENCOUNTER — ANESTHESIA EVENT (OUTPATIENT)
Dept: SURGERY | Facility: HOSPITAL | Age: 35
End: 2025-04-30
Payer: COMMERCIAL

## 2025-04-30 ENCOUNTER — ANESTHESIA (OUTPATIENT)
Dept: SURGERY | Facility: HOSPITAL | Age: 35
End: 2025-04-30
Payer: COMMERCIAL

## 2025-04-30 ENCOUNTER — HOSPITAL ENCOUNTER (OUTPATIENT)
Facility: HOSPITAL | Age: 35
Setting detail: OBSERVATION
Discharge: HOME | End: 2025-04-30
Attending: STUDENT IN AN ORGANIZED HEALTH CARE EDUCATION/TRAINING PROGRAM
Payer: COMMERCIAL

## 2025-04-30 VITALS
WEIGHT: 120 LBS | RESPIRATION RATE: 16 BRPM | SYSTOLIC BLOOD PRESSURE: 115 MMHG | DIASTOLIC BLOOD PRESSURE: 72 MMHG | HEART RATE: 72 BPM | TEMPERATURE: 98.5 F | OXYGEN SATURATION: 95 % | BODY MASS INDEX: 21.26 KG/M2 | HEIGHT: 63 IN

## 2025-04-30 DIAGNOSIS — N83.209 HEMORRHAGIC OVARIAN CYST: ICD-10-CM

## 2025-04-30 LAB
ALBUMIN SERPL BCG-MCNC: 4.3 G/DL (ref 3.5–5.2)
ALBUMIN UR-MCNC: NEGATIVE MG/DL
ALP SERPL-CCNC: 54 U/L (ref 40–150)
ALT SERPL W P-5'-P-CCNC: 17 U/L (ref 0–50)
ANION GAP SERPL CALCULATED.3IONS-SCNC: 6 MMOL/L (ref 7–15)
APPEARANCE UR: CLEAR
AST SERPL W P-5'-P-CCNC: 13 U/L (ref 0–45)
BASOPHILS # BLD AUTO: 0 10E3/UL (ref 0–0.2)
BASOPHILS NFR BLD AUTO: 0 %
BILIRUB DIRECT SERPL-MCNC: 0.16 MG/DL (ref 0–0.3)
BILIRUB SERPL-MCNC: 0.6 MG/DL
BILIRUB UR QL STRIP: NEGATIVE
BUN SERPL-MCNC: 8.7 MG/DL (ref 6–20)
CALCIUM SERPL-MCNC: 8.8 MG/DL (ref 8.8–10.4)
CHLORIDE SERPL-SCNC: 104 MMOL/L (ref 98–107)
COLOR UR AUTO: COLORLESS
CREAT SERPL-MCNC: 0.46 MG/DL (ref 0.51–0.95)
EGFRCR SERPLBLD CKD-EPI 2021: >90 ML/MIN/1.73M2
EOSINOPHIL # BLD AUTO: 0.1 10E3/UL (ref 0–0.7)
EOSINOPHIL NFR BLD AUTO: 1 %
ERYTHROCYTE [DISTWIDTH] IN BLOOD BY AUTOMATED COUNT: 12.5 % (ref 10–15)
ERYTHROCYTE [DISTWIDTH] IN BLOOD BY AUTOMATED COUNT: 12.6 % (ref 10–15)
GLUCOSE SERPL-MCNC: 125 MG/DL (ref 70–99)
GLUCOSE UR STRIP-MCNC: NEGATIVE MG/DL
HCG UR QL: NEGATIVE
HCO3 SERPL-SCNC: 28 MMOL/L (ref 22–29)
HCT VFR BLD AUTO: 32.3 % (ref 35–47)
HCT VFR BLD AUTO: 34 % (ref 35–47)
HGB BLD-MCNC: 10.5 G/DL (ref 11.7–15.7)
HGB BLD-MCNC: 10.9 G/DL (ref 11.7–15.7)
HGB UR QL STRIP: ABNORMAL
IMM GRANULOCYTES # BLD: 0 10E3/UL
IMM GRANULOCYTES NFR BLD: 0 %
KETONES UR STRIP-MCNC: NEGATIVE MG/DL
LEUKOCYTE ESTERASE UR QL STRIP: NEGATIVE
LIPASE SERPL-CCNC: 19 U/L (ref 13–60)
LYMPHOCYTES # BLD AUTO: 1.9 10E3/UL (ref 0.8–5.3)
LYMPHOCYTES NFR BLD AUTO: 20 %
MCH RBC QN AUTO: 28.2 PG (ref 26.5–33)
MCH RBC QN AUTO: 28.7 PG (ref 26.5–33)
MCHC RBC AUTO-ENTMCNC: 32.1 G/DL (ref 31.5–36.5)
MCHC RBC AUTO-ENTMCNC: 32.5 G/DL (ref 31.5–36.5)
MCV RBC AUTO: 88 FL (ref 78–100)
MCV RBC AUTO: 88 FL (ref 78–100)
MONOCYTES # BLD AUTO: 0.6 10E3/UL (ref 0–1.3)
MONOCYTES NFR BLD AUTO: 7 %
NEUTROPHILS # BLD AUTO: 6.5 10E3/UL (ref 1.6–8.3)
NEUTROPHILS NFR BLD AUTO: 72 %
NITRATE UR QL: NEGATIVE
NRBC # BLD AUTO: 0 10E3/UL
NRBC BLD AUTO-RTO: 0 /100
PH UR STRIP: 6.5 [PH] (ref 5–7)
PLATELET # BLD AUTO: 207 10E3/UL (ref 150–450)
PLATELET # BLD AUTO: 210 10E3/UL (ref 150–450)
POTASSIUM SERPL-SCNC: 3.8 MMOL/L (ref 3.4–5.3)
PROT SERPL-MCNC: 7 G/DL (ref 6.4–8.3)
RBC # BLD AUTO: 3.66 10E6/UL (ref 3.8–5.2)
RBC # BLD AUTO: 3.87 10E6/UL (ref 3.8–5.2)
RBC URINE: 0 /HPF
SODIUM SERPL-SCNC: 138 MMOL/L (ref 135–145)
SP GR UR STRIP: 1 (ref 1–1.03)
SQUAMOUS EPITHELIAL: <1 /HPF
UROBILINOGEN UR STRIP-MCNC: NORMAL MG/DL
WBC # BLD AUTO: 9.1 10E3/UL (ref 4–11)
WBC # BLD AUTO: 9.3 10E3/UL (ref 4–11)
WBC URINE: 0 /HPF

## 2025-04-30 PROCEDURE — 360N000077 HC SURGERY LEVEL 4, PER MIN: Performed by: OBSTETRICS & GYNECOLOGY

## 2025-04-30 PROCEDURE — 250N000009 HC RX 250: Performed by: OBSTETRICS & GYNECOLOGY

## 2025-04-30 PROCEDURE — 250N000011 HC RX IP 250 OP 636: Mod: JZ | Performed by: STUDENT IN AN ORGANIZED HEALTH CARE EDUCATION/TRAINING PROGRAM

## 2025-04-30 PROCEDURE — 250N000009 HC RX 250: Performed by: NURSE ANESTHETIST, CERTIFIED REGISTERED

## 2025-04-30 PROCEDURE — 76856 US EXAM PELVIC COMPLETE: CPT

## 2025-04-30 PROCEDURE — 81001 URINALYSIS AUTO W/SCOPE: CPT | Performed by: STUDENT IN AN ORGANIZED HEALTH CARE EDUCATION/TRAINING PROGRAM

## 2025-04-30 PROCEDURE — 80048 BASIC METABOLIC PNL TOTAL CA: CPT | Performed by: STUDENT IN AN ORGANIZED HEALTH CARE EDUCATION/TRAINING PROGRAM

## 2025-04-30 PROCEDURE — 370N000017 HC ANESTHESIA TECHNICAL FEE, PER MIN: Performed by: OBSTETRICS & GYNECOLOGY

## 2025-04-30 PROCEDURE — 82248 BILIRUBIN DIRECT: CPT | Performed by: STUDENT IN AN ORGANIZED HEALTH CARE EDUCATION/TRAINING PROGRAM

## 2025-04-30 PROCEDURE — 36415 COLL VENOUS BLD VENIPUNCTURE: CPT | Performed by: OBSTETRICS & GYNECOLOGY

## 2025-04-30 PROCEDURE — 83690 ASSAY OF LIPASE: CPT | Performed by: STUDENT IN AN ORGANIZED HEALTH CARE EDUCATION/TRAINING PROGRAM

## 2025-04-30 PROCEDURE — 96374 THER/PROPH/DIAG INJ IV PUSH: CPT

## 2025-04-30 PROCEDURE — 250N000011 HC RX IP 250 OP 636: Performed by: NURSE ANESTHETIST, CERTIFIED REGISTERED

## 2025-04-30 PROCEDURE — 85041 AUTOMATED RBC COUNT: CPT | Performed by: OBSTETRICS & GYNECOLOGY

## 2025-04-30 PROCEDURE — 710N000009 HC RECOVERY PHASE 1, LEVEL 1, PER MIN: Performed by: OBSTETRICS & GYNECOLOGY

## 2025-04-30 PROCEDURE — 258N000003 HC RX IP 258 OP 636: Performed by: ANESTHESIOLOGY

## 2025-04-30 PROCEDURE — 258N000003 HC RX IP 258 OP 636: Performed by: OBSTETRICS & GYNECOLOGY

## 2025-04-30 PROCEDURE — 85018 HEMOGLOBIN: CPT | Performed by: OBSTETRICS & GYNECOLOGY

## 2025-04-30 PROCEDURE — 88305 TISSUE EXAM BY PATHOLOGIST: CPT | Mod: TC | Performed by: OBSTETRICS & GYNECOLOGY

## 2025-04-30 PROCEDURE — 250N000011 HC RX IP 250 OP 636: Performed by: OBSTETRICS & GYNECOLOGY

## 2025-04-30 PROCEDURE — 272N000001 HC OR GENERAL SUPPLY STERILE: Performed by: OBSTETRICS & GYNECOLOGY

## 2025-04-30 PROCEDURE — 85048 AUTOMATED LEUKOCYTE COUNT: CPT | Performed by: OBSTETRICS & GYNECOLOGY

## 2025-04-30 PROCEDURE — 74177 CT ABD & PELVIS W/CONTRAST: CPT

## 2025-04-30 PROCEDURE — 250N000011 HC RX IP 250 OP 636: Mod: JZ | Performed by: ANESTHESIOLOGY

## 2025-04-30 PROCEDURE — 96375 TX/PRO/DX INJ NEW DRUG ADDON: CPT

## 2025-04-30 PROCEDURE — 999N000141 HC STATISTIC PRE-PROCEDURE NURSING ASSESSMENT: Performed by: OBSTETRICS & GYNECOLOGY

## 2025-04-30 PROCEDURE — 81025 URINE PREGNANCY TEST: CPT | Performed by: STUDENT IN AN ORGANIZED HEALTH CARE EDUCATION/TRAINING PROGRAM

## 2025-04-30 PROCEDURE — G0378 HOSPITAL OBSERVATION PER HR: HCPCS

## 2025-04-30 PROCEDURE — 36415 COLL VENOUS BLD VENIPUNCTURE: CPT | Performed by: STUDENT IN AN ORGANIZED HEALTH CARE EDUCATION/TRAINING PROGRAM

## 2025-04-30 PROCEDURE — 250N000011 HC RX IP 250 OP 636: Performed by: STUDENT IN AN ORGANIZED HEALTH CARE EDUCATION/TRAINING PROGRAM

## 2025-04-30 PROCEDURE — 710N000012 HC RECOVERY PHASE 2, PER MINUTE: Performed by: OBSTETRICS & GYNECOLOGY

## 2025-04-30 PROCEDURE — 88305 TISSUE EXAM BY PATHOLOGIST: CPT | Mod: 26 | Performed by: PATHOLOGY

## 2025-04-30 PROCEDURE — 85025 COMPLETE CBC W/AUTO DIFF WBC: CPT | Performed by: STUDENT IN AN ORGANIZED HEALTH CARE EDUCATION/TRAINING PROGRAM

## 2025-04-30 RX ORDER — OXYCODONE HYDROCHLORIDE 5 MG/1
10 TABLET ORAL
Status: DISCONTINUED | OUTPATIENT
Start: 2025-04-30 | End: 2025-04-30 | Stop reason: HOSPADM

## 2025-04-30 RX ORDER — HYDROMORPHONE HCL IN WATER/PF 6 MG/30 ML
0.2 PATIENT CONTROLLED ANALGESIA SYRINGE INTRAVENOUS EVERY 5 MIN PRN
Status: DISCONTINUED | OUTPATIENT
Start: 2025-04-30 | End: 2025-04-30 | Stop reason: HOSPADM

## 2025-04-30 RX ORDER — SODIUM CHLORIDE, SODIUM LACTATE, POTASSIUM CHLORIDE, CALCIUM CHLORIDE 600; 310; 30; 20 MG/100ML; MG/100ML; MG/100ML; MG/100ML
INJECTION, SOLUTION INTRAVENOUS CONTINUOUS
Status: DISCONTINUED | OUTPATIENT
Start: 2025-04-30 | End: 2025-04-30 | Stop reason: HOSPADM

## 2025-04-30 RX ORDER — OXYCODONE HYDROCHLORIDE 5 MG/1
5 TABLET ORAL
Status: DISCONTINUED | OUTPATIENT
Start: 2025-04-30 | End: 2025-04-30 | Stop reason: HOSPADM

## 2025-04-30 RX ORDER — FENTANYL CITRATE 50 UG/ML
25 INJECTION, SOLUTION INTRAMUSCULAR; INTRAVENOUS EVERY 5 MIN PRN
Status: DISCONTINUED | OUTPATIENT
Start: 2025-04-30 | End: 2025-04-30 | Stop reason: HOSPADM

## 2025-04-30 RX ORDER — ONDANSETRON 4 MG/1
4 TABLET, ORALLY DISINTEGRATING ORAL EVERY 30 MIN PRN
Status: DISCONTINUED | OUTPATIENT
Start: 2025-04-30 | End: 2025-04-30 | Stop reason: HOSPADM

## 2025-04-30 RX ORDER — KETOROLAC TROMETHAMINE 15 MG/ML
15 INJECTION, SOLUTION INTRAMUSCULAR; INTRAVENOUS ONCE
Status: COMPLETED | OUTPATIENT
Start: 2025-04-30 | End: 2025-04-30

## 2025-04-30 RX ORDER — ONDANSETRON 2 MG/ML
4 INJECTION INTRAMUSCULAR; INTRAVENOUS EVERY 30 MIN PRN
Status: DISCONTINUED | OUTPATIENT
Start: 2025-04-30 | End: 2025-04-30 | Stop reason: HOSPADM

## 2025-04-30 RX ORDER — NALOXONE HYDROCHLORIDE 0.4 MG/ML
0.1 INJECTION, SOLUTION INTRAMUSCULAR; INTRAVENOUS; SUBCUTANEOUS
Status: DISCONTINUED | OUTPATIENT
Start: 2025-04-30 | End: 2025-04-30 | Stop reason: HOSPADM

## 2025-04-30 RX ORDER — IOPAMIDOL 755 MG/ML
58 INJECTION, SOLUTION INTRAVASCULAR ONCE
Status: COMPLETED | OUTPATIENT
Start: 2025-04-30 | End: 2025-04-30

## 2025-04-30 RX ORDER — PROPOFOL 10 MG/ML
INJECTION, EMULSION INTRAVENOUS CONTINUOUS PRN
Status: DISCONTINUED | OUTPATIENT
Start: 2025-04-30 | End: 2025-04-30

## 2025-04-30 RX ORDER — IBUPROFEN 600 MG/1
600 TABLET, FILM COATED ORAL EVERY 6 HOURS PRN
COMMUNITY
Start: 2025-04-30

## 2025-04-30 RX ORDER — HYDROMORPHONE HCL IN WATER/PF 6 MG/30 ML
0.4 PATIENT CONTROLLED ANALGESIA SYRINGE INTRAVENOUS EVERY 5 MIN PRN
Status: DISCONTINUED | OUTPATIENT
Start: 2025-04-30 | End: 2025-04-30 | Stop reason: HOSPADM

## 2025-04-30 RX ORDER — ACETAMINOPHEN 325 MG/1
650 TABLET ORAL
Status: DISCONTINUED | OUTPATIENT
Start: 2025-04-30 | End: 2025-04-30 | Stop reason: HOSPADM

## 2025-04-30 RX ORDER — SODIUM CHLORIDE, SODIUM LACTATE, POTASSIUM CHLORIDE, AND CALCIUM CHLORIDE .6; .31; .03; .02 G/100ML; G/100ML; G/100ML; G/100ML
IRRIGANT IRRIGATION PRN
Status: DISCONTINUED | OUTPATIENT
Start: 2025-04-30 | End: 2025-04-30 | Stop reason: HOSPADM

## 2025-04-30 RX ORDER — OXYCODONE HYDROCHLORIDE 5 MG/1
5-10 TABLET ORAL EVERY 4 HOURS PRN
Qty: 10 TABLET | Refills: 0 | Status: SHIPPED | OUTPATIENT
Start: 2025-04-30

## 2025-04-30 RX ORDER — IBUPROFEN 200 MG
600 TABLET ORAL ONCE
Status: DISCONTINUED | OUTPATIENT
Start: 2025-04-30 | End: 2025-04-30 | Stop reason: HOSPADM

## 2025-04-30 RX ORDER — PROPOFOL 10 MG/ML
INJECTION, EMULSION INTRAVENOUS PRN
Status: DISCONTINUED | OUTPATIENT
Start: 2025-04-30 | End: 2025-04-30

## 2025-04-30 RX ORDER — KETOROLAC TROMETHAMINE 30 MG/ML
INJECTION, SOLUTION INTRAMUSCULAR; INTRAVENOUS PRN
Status: DISCONTINUED | OUTPATIENT
Start: 2025-04-30 | End: 2025-04-30

## 2025-04-30 RX ORDER — DEXAMETHASONE SODIUM PHOSPHATE 10 MG/ML
4 INJECTION, SOLUTION INTRAMUSCULAR; INTRAVENOUS
Status: DISCONTINUED | OUTPATIENT
Start: 2025-04-30 | End: 2025-04-30 | Stop reason: HOSPADM

## 2025-04-30 RX ORDER — ACETAMINOPHEN 325 MG/1
650 TABLET ORAL EVERY 4 HOURS PRN
COMMUNITY
Start: 2025-04-30

## 2025-04-30 RX ORDER — DEXAMETHASONE SODIUM PHOSPHATE 10 MG/ML
INJECTION, SOLUTION INTRAMUSCULAR; INTRAVENOUS PRN
Status: DISCONTINUED | OUTPATIENT
Start: 2025-04-30 | End: 2025-04-30

## 2025-04-30 RX ORDER — ONDANSETRON 2 MG/ML
INJECTION INTRAMUSCULAR; INTRAVENOUS PRN
Status: DISCONTINUED | OUTPATIENT
Start: 2025-04-30 | End: 2025-04-30

## 2025-04-30 RX ORDER — BUPIVACAINE HYDROCHLORIDE 2.5 MG/ML
INJECTION, SOLUTION INFILTRATION; PERINEURAL PRN
Status: DISCONTINUED | OUTPATIENT
Start: 2025-04-30 | End: 2025-04-30 | Stop reason: HOSPADM

## 2025-04-30 RX ORDER — TRANEXAMIC ACID 10 MG/ML
1 INJECTION, SOLUTION INTRAVENOUS ONCE
Status: COMPLETED | OUTPATIENT
Start: 2025-04-30 | End: 2025-04-30

## 2025-04-30 RX ORDER — FENTANYL CITRATE 50 UG/ML
50 INJECTION, SOLUTION INTRAMUSCULAR; INTRAVENOUS EVERY 5 MIN PRN
Status: DISCONTINUED | OUTPATIENT
Start: 2025-04-30 | End: 2025-04-30 | Stop reason: HOSPADM

## 2025-04-30 RX ADMIN — SODIUM CHLORIDE, SODIUM LACTATE, POTASSIUM CHLORIDE, AND CALCIUM CHLORIDE: .6; .31; .03; .02 INJECTION, SOLUTION INTRAVENOUS at 06:19

## 2025-04-30 RX ADMIN — ONDANSETRON 4 MG: 2 INJECTION INTRAMUSCULAR; INTRAVENOUS at 06:42

## 2025-04-30 RX ADMIN — IOPAMIDOL 58 ML: 755 INJECTION, SOLUTION INTRAVENOUS at 01:55

## 2025-04-30 RX ADMIN — FENTANYL CITRATE 100 MCG: 50 INJECTION, SOLUTION INTRAMUSCULAR; INTRAVENOUS at 06:27

## 2025-04-30 RX ADMIN — DEXAMETHASONE SODIUM PHOSPHATE 4 MG: 10 INJECTION, SOLUTION INTRAMUSCULAR; INTRAVENOUS at 06:42

## 2025-04-30 RX ADMIN — HYDROMORPHONE HYDROCHLORIDE 1 MG: 1 INJECTION, SOLUTION INTRAMUSCULAR; INTRAVENOUS; SUBCUTANEOUS at 06:50

## 2025-04-30 RX ADMIN — ROCURONIUM 50 MG: 50 INJECTION, SOLUTION INTRAVENOUS at 06:27

## 2025-04-30 RX ADMIN — KETOROLAC TROMETHAMINE 15 MG: 15 INJECTION, SOLUTION INTRAMUSCULAR; INTRAVENOUS at 01:00

## 2025-04-30 RX ADMIN — MIDAZOLAM HYDROCHLORIDE 2 MG: 1 INJECTION, SOLUTION INTRAMUSCULAR; INTRAVENOUS at 06:20

## 2025-04-30 RX ADMIN — SODIUM CHLORIDE, SODIUM LACTATE, POTASSIUM CHLORIDE, AND CALCIUM CHLORIDE: .6; .31; .03; .02 INJECTION, SOLUTION INTRAVENOUS at 08:09

## 2025-04-30 RX ADMIN — TRANEXAMIC ACID 1 G: 10 INJECTION, SOLUTION INTRAVENOUS at 05:14

## 2025-04-30 RX ADMIN — PROPOFOL 200 MCG/KG/MIN: 10 INJECTION, EMULSION INTRAVENOUS at 06:27

## 2025-04-30 RX ADMIN — KETOROLAC TROMETHAMINE 15 MG: 30 INJECTION, SOLUTION INTRAMUSCULAR at 07:17

## 2025-04-30 RX ADMIN — SUGAMMADEX 110 MG: 100 INJECTION, SOLUTION INTRAVENOUS at 07:15

## 2025-04-30 RX ADMIN — SODIUM CHLORIDE 1000 ML: 0.9 INJECTION, SOLUTION INTRAVENOUS at 05:13

## 2025-04-30 RX ADMIN — PROPOFOL 200 MG: 10 INJECTION, EMULSION INTRAVENOUS at 06:27

## 2025-04-30 ASSESSMENT — ACTIVITIES OF DAILY LIVING (ADL)
ADLS_ACUITY_SCORE: 41

## 2025-04-30 ASSESSMENT — COPD QUESTIONNAIRES: COPD: 0

## 2025-04-30 ASSESSMENT — ENCOUNTER SYMPTOMS: SEIZURES: 0

## 2025-04-30 NOTE — ANESTHESIA PREPROCEDURE EVALUATION
Anesthesia Pre-Procedure Evaluation    Patient: Neeraj Cid   MRN: [id number] : 1990        Procedure : Procedure(s):  EXCISION, CYST, OVARY, LAPAROSCOPIC  SALPINGO-OOPHORECTOMY, LAPAROSCOPIC          Past Medical History:   Diagnosis Date    Dermoid cyst 2017    surgically removed     (normal spontaneous vaginal delivery)     Positive H. pylori test 2017    Skin tag of perianal region 2014    From faulty second degree midline laceration repair after last delivery. Repaired .    Urinary tract infection       Past Surgical History:   Procedure Laterality Date    LAPAROSCOPIC CYSTECTOMY OVARIAN (ONCOLOGY)  2017    dermoid cyst removal      No Known Allergies   Social History     Tobacco Use    Smoking status: Never    Smokeless tobacco: Never   Substance Use Topics    Alcohol use: Never      Wt Readings from Last 1 Encounters:   25 54.4 kg (120 lb)        Anesthesia Evaluation            ROS/MED HX  ENT/Pulmonary:    (-) COPD and sleep apnea   Neurologic:    (-) no seizures and no CVA   Cardiovascular:    (-) hypertension and CHF   METS/Exercise Tolerance:     Hematologic:     (+)      anemia,          Musculoskeletal:       GI/Hepatic:    (-) GERD   Renal/Genitourinary:    (-) renal disease   Endo:    (-) Type II DM   Psychiatric/Substance Use:    (-) psychiatric history   Infectious Disease:       Malignancy:       Other: Comment: presents with acute abdominal pain, nausea, vomiting and dizziness with imaging findings concerning for ruptured hemorrhagic ovarian cyst and hemoperitoneum.           Physical Exam    Airway  airway exam normal      Mallampati: II   TM distance: > 3 FB   Neck ROM: full   Mouth opening: > 3 cm    Respiratory Devices and Support         Dental       (+) Completely normal teeth      Cardiovascular   cardiovascular exam normal          Pulmonary   pulmonary exam normal                OUTSIDE LABS:  CBC:   Lab Results   Component Value Date    WBC 9.3  "04/30/2025    WBC 9.1 04/30/2025    HGB 10.5 (L) 04/30/2025    HGB 10.9 (L) 04/30/2025    HCT 32.3 (L) 04/30/2025    HCT 34.0 (L) 04/30/2025     04/30/2025     04/30/2025     BMP:   Lab Results   Component Value Date     04/30/2025     09/13/2024    POTASSIUM 3.8 04/30/2025    POTASSIUM 3.4 09/13/2024    CHLORIDE 104 04/30/2025    CHLORIDE 99 09/13/2024    CO2 28 04/30/2025    CO2 28 09/13/2024    BUN 8.7 04/30/2025    BUN 6.4 09/13/2024    CR 0.46 (L) 04/30/2025    CR 0.45 (L) 09/13/2024     (H) 04/30/2025     (H) 09/13/2024     COAGS: No results found for: \"PTT\", \"INR\", \"FIBR\"  POC:   Lab Results   Component Value Date    HCG Negative 04/30/2025    HCGS Negative 09/08/2021     HEPATIC:   Lab Results   Component Value Date    ALBUMIN 4.3 04/30/2025    PROTTOTAL 7.0 04/30/2025    ALT 17 04/30/2025    AST 13 04/30/2025    ALKPHOS 54 04/30/2025    BILITOTAL 0.6 04/30/2025     OTHER:   Lab Results   Component Value Date    KT 8.8 04/30/2025    MAG 1.7 (L) 09/09/2019    LIPASE 19 04/30/2025       Anesthesia Plan    ASA Status:  2, emergent    NPO Status:  NPO Appropriate    Anesthesia Type: General.     - Airway: ETT   Induction: Intravenous.   Maintenance: Balanced.        Consents    Anesthesia Plan(s) and associated risks, benefits, and realistic alternatives discussed. Questions answered and patient/representative(s) expressed understanding.     - Discussed: Risks, Benefits and Alternatives for BOTH SEDATION and the PROCEDURE were discussed     - Discussed with:  Patient       Use of blood products discussed: Yes.     - Discussed with: Patient.     - Consented: consented to blood products     Postoperative Care    Pain management: IV analgesics, Multi-modal analgesia, Oral pain medications.   PONV prophylaxis: Ondansetron (or other 5HT-3), Dexamethasone or Solumedrol     Comments:               Sanchez Rosa MD    Clinically Significant Risk Factors Present on Admission "                        # Anemia: based on hgb <11

## 2025-04-30 NOTE — H&P
[facility] In House OB History and Physical    Neeraj Cid MRN# [id number]   Age: 35 year old YOB: 1990     Date of Admission:  2025    Primary care provider: Brenard العراقي           Chief Complaint:   Neeraj Cid is a 35 year old female who presents with acute abdominal pain, nausea, vomiting and dizziness with imaging findings concerning for ruptured hemorrhagic ovarian cyst and hemoperitoneum.          Pregnancy history:     OBSTETRIC HISTORY:    OB History    Para Term  AB Living   4 3 3 0 1 3   SAB IAB Ectopic Multiple Live Births   0 0 0 0 3      # Outcome Date GA Lbr Grey/2nd Weight Sex Type Anes PTL Lv   4 Term 20 39w5d 06:41 3.55 kg (7 lb 13.2 oz) F Vag-Spont Local N MEI      Name: PHA,FEMALE-NEERAJ      Apgar1: 9  Apgar5: 9   3 Term 14 39w4d  3.391 kg (7 lb 7.6 oz) M Vag-Spont None N MEI      Name: Parminder      Apgar1: 8  Apgar5: 9   2 Term 12 40w0d  3.09 kg (6 lb 13 oz) F Vag-Spont None  MEI      Name: Margie Michaels AB 2010 8w0d    SAB          Prenatal Labs:   Lab Results   Component Value Date    AS Negative 10/01/2019    HEPBANG Negative 10/01/2019    GCPCRT Negative 2019    HGB 10.9 (L) 2025     Active Problem List  Patient Active Problem List   Diagnosis    Hemorrhagic ovarian cyst       Medication Prior to Admission  (Not in a hospital admission)  .        Maternal Past Medical History:     Past Medical History:   Diagnosis Date    Dermoid cyst 2017    surgically removed     (normal spontaneous vaginal delivery)     Positive H. pylori test 2017    Skin tag of perianal region 2014    From faulty second degree midline laceration repair after last delivery. Repaired .    Urinary tract infection       PSH prior laparoscopy for ovarian cyst         Physical Exam:   Vitals were reviewed  Alert, pale, appears weak  CV regular  Resp non labored  Ab soft, guarding in all four quadrants  Ext not examined  Hb  10.9    EXAM: ULTRASOUND PELVIS COMPLETE W TRANSVAGINAL AND DOPPLER LIMITED  LOCATION: [facility]  DATE: 2025     INDICATION: Hemoperitoneum on CT, abdominal pain, ovarian cyst.  COMPARISON: CT abdomen and pelvis from hours earlier.  TECHNIQUE: Transabdominal scans were performed. Endovaginal ultrasound was performed to better visualize the adnexa. Color flow with spectral Doppler and waveform analysis performed.     FINDINGS:     UTERUS: 9 x 6 x 5 cm. Normal in size and position with no masses.     ENDOMETRIUM: 12 mm. Normal smooth endometrium.     RIGHT OVARY: 6 x 5 x 4 cm. Internal arterial and venous vascular flow identified. Internal nearly 4 cm hemorrhagic cyst.     LEFT OVARY: 4 x 3 x 3 cm. Internal arterial and venous vascular flow identified.     Moderate amount of hemoperitoneum in the pelvis.      Assessment:   Neeraj Cid is a  with unknown LMP and acute abdomen, anemia, concerns for evolving hemorrhagic shock and findings consistent with ruptured ovarian cyst.        Plan:   Discussed with patient and partner recommendations for surgical diagnosis and treatment.  Discussed risk of surgery to include bleeding, infection, possible need for blood transfusion, possible need for open procedure and possible risk of unilateral oopherectomy. Discussed as long as one ovary remains, fertility and hormonal function can be preserved but did discuss risk of decreased ovarian reserve with advance age.  Discussed option of expectant management with serial labs but risk of this is further bleeding, likely that pain would last longer and possibility that if surgery is delayed that it can become more complicated.  After answering all questions, she is comfortable proceeding.  OR team notified.    Zoe Cline MD    Total time at bedside and in coordination of care is 45 minutes.

## 2025-04-30 NOTE — ANESTHESIA PROCEDURE NOTES
Airway       Patient location during procedure: OR       Procedure Start/Stop Times: 4/30/2025 6:29 AM  Staff -        CRNA: Rudolph Gold APRN CRNA       Performed By: CRNAIndications and Patient Condition       Indications for airway management: jimbo-procedural       Induction type:intravenous       Mask difficulty assessment: 1 - vent by mask    Final Airway Details       Final airway type: endotracheal airway       Successful airway: ETT - single  Endotracheal Airway Details        ETT size (mm): 6.5       Cuffed: yes       Successful intubation technique: direct laryngoscopy       DL Blade Type: MAC 3       Grade View of Cords: 1       Adjucts: stylet       Position: Right       Measured from: gums/teeth       Secured at (cm): 20       Bite block used: Oral Airway    Post intubation assessment        Placement verified by: capnometry and equal breath sounds        Number of attempts at approach: 1       Secured with: tape       Ease of procedure: easy       Dentition: Intact and Unchanged    Medication(s) Administered   Medication Administration Time: 4/30/2025 6:29 AM

## 2025-04-30 NOTE — ANESTHESIA CARE TRANSFER NOTE
Patient: Neeraj MIRANDA Pha    Procedure: Procedure(s):  EXCISION, CYST, OVARY, LAPAROSCOPIC       Diagnosis: Hemorrhagic ovarian cyst [N83.209]  Diagnosis Additional Information: No value filed.    Anesthesia Type:   General     Note:    Oropharynx: oropharynx clear of all foreign objects and spontaneously breathing  Level of Consciousness: awake  Oxygen Supplementation: face mask  Level of Supplemental Oxygen (L/min / FiO2): 6  Independent Airway: airway patency satisfactory and stable  Dentition: dentition unchanged  Vital Signs Stable: post-procedure vital signs reviewed and stable  Report to RN Given: handoff report given  Patient transferred to: PACU    Handoff Report: Identifed the Patient, Identified the Reponsible Provider, Reviewed the pertinent medical history, Discussed the surgical course, Reviewed Intra-OP anesthesia mangement and issues during anesthesia, Set expectations for post-procedure period and Allowed opportunity for questions and acknowledgement of understanding      Vitals:  Vitals Value Taken Time   /62 04/30/25   0731   Temp 36.4  C (97.52  F) 04/30/25 0731   Pulse 78 04/30/25 0731   Resp 16 04/30/25   0731   SpO2 99 % 04/30/25 0731   Vitals shown include unfiled device data.    Electronically Signed By: PRAVIN Loya CRNA  April 30, 2025  7:33 AM

## 2025-04-30 NOTE — OP NOTE
PROCEDURE NOTE - LAPAROSCOPY, RIGHT OVARIAN CYSTECTOMY    NAME: Neeraj Cid   : 1990   MRN: [id number]      DATE OF SERVICE: 2025     PREOPERATIVE DIAGNOSIS:   hypotension, hemoperitoneum, suspected right hemorrhagic ovarian cyst    POSTOPERATIVE DIAGNOSIS:   same    PROCEDURES: laparoscopy, right ovarian cystectomy, evacuation of hemoperitoneum    SURGEON: Zoe Cline MD      ANESTHESIA: general     ESTIMATED BLOOD LOSS: 270 mL - all hemoperitoneum    HISTORY OF PRESENT ILLNESS:   Neeraj Cid is a 35 year old female who presented to the ER with an acute abdomen, anemia, hypotension and imaging concerning for moderate hemoperitoneum and ruptured hemorrhagic cyst..     CONSENT:  She was met preoperatively, where we discussed the procedure and the risks associated with the procedure. She understood these to be, but not limited to injury to adjacent organs including bladder, bowel, ureter, infection and bleeding as well as risk of blood transfusion and possible need for removal of ovary with concern for decreasing ovarian reserve. Patient signed consent and was brought back to the operating room in stable condition.     PROCEDURE:  Patient underwent induction of a general anesthetic, she was carefully prepped and draped in sterile dorsal lithotomy position for the procedure. Timeout was performed. Bladder was drained with a Taylor catheter.   A sterile speculum was placed.  The anterior lip of the cervix was grasped with a single tooth tenaculum.  A uterine manipulator was placed into the uterus.     Attention was turned to the abdomen. An incision inferior to the umbilicus was made. A Veress needle was introduced with a 2 pop technique, saline drop test confirmed adequate placement. Opening pressure was 3-4. Insufflation was done until 15mm of pressure was established. A 5 nonbladed trocar was placed above the umbilicus. Two more port sights were place in the suprapubic and left lower quadrants  under direct visualization. [place] assistance was then used.     The procedure began with identifying the anatomy. The uterus was visualized was found to be normal appearing, tubes appeared normal, ovaries were prominent.  The right ovary had a 4 cm cyst that was bleeding from a small rent, hemoperitoneum was present in the pelvis, it did not extend to right upper quadrant.     The hemoperitoneum was evacuated with suction.  The right ovary was visualized and the portion of the ovarian cyst that was bleeding was excised with the ligasure and sent for pathology.  The remainder of the cyst bed was cauterized to achieve hemostasis and surgicell powder placed.     The trocars and gas were then removed from the abdomen. Pneumoperitoneum released.  Prior to this camera was repositioned to left lower quadrant to confirm no evidence of injury at umbilical trochar site. Skin was closed with 4-0 monocryl suture. Steri-Strips applied. Instruments were removed from vagina. No active bleeding was noted. Sponge and needle counts were correct X 2. She was taken to recovery in stable condition.     Zoe Cline MD FACOG  [facility]  [phone]

## 2025-04-30 NOTE — ED NOTES
"[facility] ED Handoff Report    ED Chief Complaint: abd pain    ED Diagnosis:  (N83.209) Hemorrhagic ovarian cyst  Comment: CT and pelvic US completed  Plan: OBGYN consult       PMH:    Past Medical History:   Diagnosis Date    Dermoid cyst 2017    surgically removed     (normal spontaneous vaginal delivery)     Positive H. pylori test 2017    Skin tag of perianal region 2014    From faulty second degree midline laceration repair after last delivery. Repaired .    Urinary tract infection         Code Status:  No Order     Falls Risk: No Band: Not applicable    Current Living Situation/Residence: lives with a significant other and children    Elimination Status: Continent: Yes     Activity Level: Independent    Patients Preferred Language:  Other: ong     Needed: Yes Where is the patient located?    Vital Signs:  BP 92/57   Pulse 67   Temp 97  F (36.1  C) (Temporal)   Resp 20   Ht 1.6 m (5' 3\")   Wt 54.4 kg (120 lb)   LMP 2025   SpO2 98%   BMI 21.26 kg/m       Cardiac Rhythm: NA    Pain Score: 8/10    Is the Patient Confused:  No    Last Food or Drink:  PTA  at 0000    Focused Assessment:  GI/    Tests Performed: Done: Labs and Imaging    Treatments Provided:  IV toradol given for pain    Family Dynamics/Concerns: No    Family Updated On Visitor Policy: Yes    Plan of Care Communicated to Family: Yes    Who Was Updated about Plan of Care: Family at bedside    Belongings Checklist Done and Signed by Patient: Yes    Belongings Sent with Patient: clothing, cell phone    Medications sent with patient: NA    Covid: asymptomatic , NA    Additional Information: Independent at baseline. A&Ox4; able to make needs known appropriately. On RA. Soft BP, MD aware. Pain decreased with IV toradol.     RN: Anamaria Oconnell RN 2025 4:28 AM       "

## 2025-04-30 NOTE — ED PROVIDER NOTES
EMERGENCY DEPARTMENT ENCOUNTER      NAME: Neeraj Cid  AGE: 35 year old female  YOB: 1990  MRN: [id number]  EVALUATION DATE & TIME: No admission date for patient encounter.    PCP: Bernard العراقي    ED PROVIDER: Ruslan Bentley MD      Chief Complaint   Patient presents with    Abdominal Pain         FINAL IMPRESSION:  1. Hemorrhagic ovarian cyst          ED COURSE & MEDICAL DECISION MAKING:    Pertinent Labs & Imaging studies reviewed. (See chart for details)  35 year old female presents to the Emergency Department for evaluation of abd pain    ED Course as of 04/30/25 0405   Wed Apr 30, 2025   0043 Patient is a 35-year-old female who presents to the emergency department with generalized abdominal discomfort that began gradually approximately 12 hours prior to arrival, constant, and has not tried anything at home to help with symptoms.  No other associated symptoms.  On exam she has generalized abdominal tenderness, no evidence of peritonitis.  Otherwise unremarkable exam.  Differential diagnosis includes appendicitis, small bowel obstruction, gastroenteritis, colitis, pancreatitis, biliary pathology, ectopic pregnancy, UTI.   0116 No electrolyte abnormalities or kidney injury.  No transaminitis or pancreatitis.  Urinalysis shows microscopic hematuria.  No infection.  Pregnancy test negative.  CBC shows no leukocytosis.  Mild anemia with a hemoglobin of 10.9.   0210 CT scan shows a moderate amount of hemoperitoneum with a small amount of fluid around the liver and spleen.  Since pregnancy test is negative, this is not ruptured ectopic.  There is a 3.3 cm right adnexal cyst and left heterogeneous adnexa. The hemoperitoneum could be from ruptured hemorrhagic cyst.  Will obtain pelvic ultrasound.   0352 Pelvic ultrasound shows 4 cm right ovarian hemorrhagic cyst with moderate hemoperitoneum.  As her blood pressure is now 92/57, OB/GYN paged given this in the moderate amount.   0401 OB/GYN will admit the  patient to their service for observation.       Medical Decision Making  I independently interpreted the CT abd and note moderate hemoperitoneum. See radiology report for final interpretation.  I discussed the care with another health care provider: OBGYN  Admit.    MIPS (CTPE, Dental pain, Taylor, Sinusitis, Asthma/COPD, Head Trauma): Not Applicable    SEPSIS: None            At the conclusion of the encounter I discussed the results of all of the tests and the disposition. The questions were answered. The patient or family acknowledged understanding and was agreeable with the care plan.     0 minutes of critical care time     MEDICATIONS GIVEN IN THE EMERGENCY:  Medications   ondansetron (ZOFRAN) injection 4 mg (0 mg Intravenous Hold 25 0101)   ketorolac (TORADOL) injection 15 mg (15 mg Intravenous $Given 25 0100)   iopamidol (ISOVUE-370) solution 58 mL (58 mLs Intravenous $Given 25 0155)       NEW PRESCRIPTIONS STARTED AT TODAY'S ER VISIT  New Prescriptions    No medications on file          =================================================================    HPI    Patient information was obtained from: patient     Use of : N/A,  declined         Neeraj Cid is a 35 year old female with a pertinent history of hyperlipidemia who presents to this ED via walk-in for evaluation of abdominal pain.    At 1:00PM today, patient developed diffuse abdominal pain. This has gradually worsened throughout the day. She describes her pain as constant and pinching. Reports a remote history of laparoscopic abdominal surgery, but she is unsure what it was for. Denies other symptoms. Denies taking medication prior to arrival.    PAST MEDICAL HISTORY:  Past Medical History:   Diagnosis Date    Dermoid cyst 2017    surgically removed     (normal spontaneous vaginal delivery)     Positive H. pylori test 2017    Skin tag of perianal region 2014    From faulty second degree  "midline laceration repair after last delivery. Repaired 4/20.    Urinary tract infection        PAST SURGICAL HISTORY:  Past Surgical History:   Procedure Laterality Date    LAPAROSCOPIC CYSTECTOMY OVARIAN (ONCOLOGY)  03/2017    dermoid cyst removal           CURRENT MEDICATIONS:    acetaminophen (TYLENOL) 325 MG tablet  docusate sodium (COLACE) 100 MG capsule  famotidine (PEPCID) 20 MG tablet  ferrous sulfate 325 (65 FE) MG tablet  ibuprofen (ADVIL,MOTRIN) 800 MG tablet        ALLERGIES:  No Known Allergies    FAMILY HISTORY:  Family History   Problem Relation Age of Onset    No Known Problems Mother     No Known Problems Father     No Known Problems Sister     No Known Problems Brother     No Known Problems Brother        SOCIAL HISTORY:   Social History     Socioeconomic History    Marital status:    Tobacco Use    Smoking status: Never    Smokeless tobacco: Never   Substance and Sexual Activity    Alcohol use: Never    Drug use: Never    Sexual activity: Yes     Partners: Male   Social History Narrative    Lives with  (Sheila), in-laws and 2 children       VITALS:  BP 92/57   Pulse 67   Temp 97  F (36.1  C) (Temporal)   Resp 20   Ht 1.6 m (5' 3\")   Wt 54.4 kg (120 lb)   LMP 03/29/2025   SpO2 98%   BMI 21.26 kg/m      PHYSICAL EXAM    Physical Exam  Vitals and nursing note reviewed.   Constitutional:       General: She is not in acute distress.     Appearance: Normal appearance. She is normal weight. She is not ill-appearing.   HENT:      Head: Normocephalic and atraumatic.      Nose: Nose normal.      Mouth/Throat:      Mouth: Mucous membranes are moist.   Eyes:      Extraocular Movements: Extraocular movements intact.      Conjunctiva/sclera: Conjunctivae normal.   Cardiovascular:      Rate and Rhythm: Normal rate.      Pulses: Normal pulses.   Pulmonary:      Effort: Pulmonary effort is normal.   Abdominal:      General: Abdomen is flat. There is no distension.      Palpations: Abdomen is " soft.      Tenderness: There is no abdominal tenderness.   Musculoskeletal:         General: Normal range of motion.      Cervical back: Normal range of motion.      Right lower leg: No edema.      Left lower leg: No edema.   Skin:     General: Skin is warm and dry.      Coloration: Skin is not jaundiced or pale.   Neurological:      General: No focal deficit present.      Mental Status: She is alert and oriented to person, place, and time. Mental status is at baseline.   Psychiatric:         Mood and Affect: Mood normal.         Behavior: Behavior normal.         Thought Content: Thought content normal.         Judgment: Judgment normal.            LAB:  All pertinent labs reviewed and interpreted.  Results for orders placed or performed during the hospital encounter of 04/30/25   CT Abdomen Pelvis w Contrast    Impression    IMPRESSION:   1.  Moderate amount of pelvic hemoperitoneum and small amount of intermediate density fluid along the liver and spleen. Correlate to confirm hCG is negative. In the setting of a negative hCG, findings could be due to ruptured hemorrhagic cyst.  3.3 cm   right adnexal cyst. Heterogeneous left adnexa. Pelvic ultrasound correlation recommended.     US Pelvis Cmplt w Transvag & Doppler LmtPel Duplex Limited    Impression    IMPRESSION:    1.  Moderate amount of hemoperitoneum in the pelvis with roughly 4 cm hemorrhagic cyst. This assumes a negative hCG. If hCG is positive, then ruptured ectopic pregnancy needs to be considered.    HEMORRHAGIC CYSTS:  Premenopausal women/early post-menopausal (<5 years from LMP):  <=5 cm: no follow up needed.   >5 cm: follow up in 6-12 weeks to ensure resolution    Late post-menopausal women (>5 years from LMP):  Any size should be considered neoplastic and surgical evaluation should be considered.    REFERENCE:  Management of Asymptomatic Ovarian and Other Adnexal Cysts Imaged at US: Society of Radiologists in Ultrasound Consensus Conference  Statement. Radiology September 2010; [phone].    Simple Adnexal Cysts: SRU Consensus Conference Update on Follow-up and Reporting. Radiology September 2019. [phone].     Basic metabolic panel   Result Value Ref Range    Sodium 138 135 - 145 mmol/L    Potassium 3.8 3.4 - 5.3 mmol/L    Chloride 104 98 - 107 mmol/L    Carbon Dioxide (CO2) 28 22 - 29 mmol/L    Anion Gap 6 (L) 7 - 15 mmol/L    Urea Nitrogen 8.7 6.0 - 20.0 mg/dL    Creatinine 0.46 (L) 0.51 - 0.95 mg/dL    GFR Estimate >90 >60 mL/min/1.73m2    Calcium 8.8 8.8 - 10.4 mg/dL    Glucose 125 (H) 70 - 99 mg/dL   Hepatic function panel   Result Value Ref Range    Protein Total 7.0 6.4 - 8.3 g/dL    Albumin 4.3 3.5 - 5.2 g/dL    Bilirubin Total 0.6 <=1.2 mg/dL    Alkaline Phosphatase 54 40 - 150 U/L    AST 13 0 - 45 U/L    ALT 17 0 - 50 U/L    Bilirubin Direct 0.16 0.00 - 0.30 mg/dL   Result Value Ref Range    Lipase 19 13 - 60 U/L   HCG qualitative urine   Result Value Ref Range    hCG Urine Qualitative Negative Negative   UA with Microscopic reflex to Culture    Specimen: Urine, Clean Catch   Result Value Ref Range    Color Urine Colorless Colorless, Straw, Light Yellow, Yellow    Appearance Urine Clear Clear    Glucose Urine Negative Negative mg/dL    Bilirubin Urine Negative Negative    Ketones Urine Negative Negative mg/dL    Specific Gravity Urine 1.002 1.001 - 1.030    Blood Urine 0.03 mg/dL (A) Negative    pH Urine 6.5 5.0 - 7.0    Protein Albumin Urine Negative Negative mg/dL    Urobilinogen Urine Normal Normal mg/dL    Nitrite Urine Negative Negative    Leukocyte Esterase Urine Negative Negative    RBC Urine 0 <=2 /HPF    WBC Urine 0 <=5 /HPF    Squamous Epithelials Urine <1 <=1 /HPF   CBC with platelets and differential   Result Value Ref Range    WBC Count 9.1 4.0 - 11.0 10e3/uL    RBC Count 3.87 3.80 - 5.20 10e6/uL    Hemoglobin 10.9 (L) 11.7 - 15.7 g/dL    Hematocrit 34.0 (L) 35.0 - 47.0 %    MCV 88 78 - 100 fL    MCH 28.2 26.5 - 33.0 pg     MCHC 32.1 31.5 - 36.5 g/dL    RDW 12.5 10.0 - 15.0 %    Platelet Count 210 150 - 450 10e3/uL    % Neutrophils 72 %    % Lymphocytes 20 %    % Monocytes 7 %    % Eosinophils 1 %    % Basophils 0 %    % Immature Granulocytes 0 %    NRBCs per 100 WBC 0 <1 /100    Absolute Neutrophils 6.5 1.6 - 8.3 10e3/uL    Absolute Lymphocytes 1.9 0.8 - 5.3 10e3/uL    Absolute Monocytes 0.6 0.0 - 1.3 10e3/uL    Absolute Eosinophils 0.1 0.0 - 0.7 10e3/uL    Absolute Basophils 0.0 0.0 - 0.2 10e3/uL    Absolute Immature Granulocytes 0.0 <=0.4 10e3/uL    Absolute NRBCs 0.0 10e3/uL       RADIOLOGY:  Reviewed all pertinent imaging. Please see official radiology report.  US Pelvis Cmplt w Transvag & Doppler LmtPel Duplex Limited   Final Result   IMPRESSION:     1.  Moderate amount of hemoperitoneum in the pelvis with roughly 4 cm hemorrhagic cyst. This assumes a negative hCG. If hCG is positive, then ruptured ectopic pregnancy needs to be considered.      HEMORRHAGIC CYSTS:   Premenopausal women/early post-menopausal (<5 years from LMP):   <=5 cm: no follow up needed.    >5 cm: follow up in 6-12 weeks to ensure resolution      Late post-menopausal women (>5 years from LMP):   Any size should be considered neoplastic and surgical evaluation should be considered.      REFERENCE:   Management of Asymptomatic Ovarian and Other Adnexal Cysts Imaged at US: Society of Radiologists in Ultrasound Consensus Conference Statement. Radiology September 2010; [phone].      Simple Adnexal Cysts: SRU Consensus Conference Update on Follow-up and Reporting. Radiology September 2019. [phone].         CT Abdomen Pelvis w Contrast   Final Result   IMPRESSION:    1.  Moderate amount of pelvic hemoperitoneum and small amount of intermediate density fluid along the liver and spleen. Correlate to confirm hCG is negative. In the setting of a negative hCG, findings could be due to ruptured hemorrhagic cyst.  3.3 cm    right adnexal cyst. Heterogeneous left  adnexa. Pelvic ultrasound correlation recommended.             PROCEDURES:   None      [facility] Documentation:   CMS Diagnoses: None             I, Carli Orr, am serving as a scribe to document services personally performed by Ruslan Bentley MD based on my observation and the provider's statements to me. I, Ruslan Bentley MD, attest that Carli Orr is acting in a scribe capacity, has observed my performance of the services and has documented them in accordance with my direction.    Ruslan Bentley MD  [facility] EMERGENCY DEPARTMENT  [facility] [facility] [address]  [phone]       Ruslan Bentley MD  04/30/25 8965

## 2025-04-30 NOTE — ANESTHESIA POSTPROCEDURE EVALUATION
Patient: Neeraj MIRANDA Pha    Procedure: Procedure(s):  EXCISION, CYST, OVARY, LAPAROSCOPIC       Anesthesia Type:  General    Note:  Disposition: Admission   Postop Pain Control: Uneventful            Sign Out: Well controlled pain   PONV: No   Neuro/Psych: Uneventful            Sign Out: Acceptable/Baseline neuro status   Airway/Respiratory: Uneventful            Sign Out: Acceptable/Baseline resp. status   CV/Hemodynamics: Uneventful            Sign Out: Acceptable CV status   Other NRE: NONE   DID A NON-ROUTINE EVENT OCCUR? No           Last vitals:  Vitals Value Taken Time   /75 04/30/25 0830   Temp 36.8  C (98.24  F) 04/30/25 0833   Pulse 76 04/30/25 0831   Resp 25 04/30/25 0831   SpO2 92 % 04/30/25 0832   Vitals shown include unfiled device data.    Electronically Signed By: Christopher J. Behrens, MD  April 30, 2025  10:38 AM

## 2025-04-30 NOTE — ED TRIAGE NOTES
"General ABD pain of 9 since 1PM today. Patient describes a \"pinching pain\" with 2 bouts of vomiting. No diarrhea. Some constipation, last BM 3PM.Denies pregnancy.  No interventions at home.   Triage Assessment (Adult)       Row Name 04/29/25 6132          Triage Assessment    Airway WDL WDL        Respiratory WDL    Respiratory WDL WDL        Skin Circulation/Temperature WDL    Skin Circulation/Temperature WDL WDL        Cardiac WDL    Cardiac WDL WDL        Peripheral/Neurovascular WDL    Peripheral Neurovascular WDL WDL        Cognitive/Neuro/Behavioral WDL    Cognitive/Neuro/Behavioral WDL WDL                     "

## 2025-04-30 NOTE — MEDICATION SCRIBE - ADMISSION MEDICATION HISTORY
Medication Scribe Admission Medication History    Admission medication history is complete. The information provided in this note is only as accurate as the sources available at the time of the update.    Information Source(s): Patient via in-person    Pertinent Information: Patient states that she is taking only Tylenol PRN    Changes made to PTA medication list:  Added: None  Deleted: Docusate, Famotidine, Ferrous sulfate, Ibuprofen (per patient)  Changed: None    Allergies reviewed with patient and updates made in EHR: yes    Medication History Completed By: Nadine Bloom 4/30/2025 5:31 AM    PTA Med List   Medication Sig Last Dose/Taking    acetaminophen (TYLENOL) 325 MG tablet [ACETAMINOPHEN (TYLENOL) 325 MG TABLET] Take 1-2 tablets (325-650 mg total) by mouth every 4 (four) hours as needed. Taking As Needed

## 2025-05-02 ENCOUNTER — HOSPITAL ENCOUNTER (EMERGENCY)
Facility: HOSPITAL | Age: 35
Discharge: HOME | End: 2025-05-03
Attending: STUDENT IN AN ORGANIZED HEALTH CARE EDUCATION/TRAINING PROGRAM | Admitting: STUDENT IN AN ORGANIZED HEALTH CARE EDUCATION/TRAINING PROGRAM
Payer: COMMERCIAL

## 2025-05-02 DIAGNOSIS — R10.84 GENERALIZED ABDOMINAL PAIN: ICD-10-CM

## 2025-05-02 LAB
BASOPHILS # BLD AUTO: 0 10E3/UL (ref 0–0.2)
BASOPHILS NFR BLD AUTO: 0 %
EOSINOPHIL # BLD AUTO: 0.1 10E3/UL (ref 0–0.7)
EOSINOPHIL NFR BLD AUTO: 1 %
ERYTHROCYTE [DISTWIDTH] IN BLOOD BY AUTOMATED COUNT: 12.4 % (ref 10–15)
HCT VFR BLD AUTO: 27.9 % (ref 35–47)
HGB BLD-MCNC: 9.5 G/DL (ref 11.7–15.7)
IMM GRANULOCYTES # BLD: 0 10E3/UL
IMM GRANULOCYTES NFR BLD: 0 %
LYMPHOCYTES # BLD AUTO: 1.6 10E3/UL (ref 0.8–5.3)
LYMPHOCYTES NFR BLD AUTO: 16 %
MCH RBC QN AUTO: 29.7 PG (ref 26.5–33)
MCHC RBC AUTO-ENTMCNC: 34.1 G/DL (ref 31.5–36.5)
MCV RBC AUTO: 87 FL (ref 78–100)
MONOCYTES # BLD AUTO: 0.5 10E3/UL (ref 0–1.3)
MONOCYTES NFR BLD AUTO: 5 %
NEUTROPHILS # BLD AUTO: 7.8 10E3/UL (ref 1.6–8.3)
NEUTROPHILS NFR BLD AUTO: 78 %
NRBC # BLD AUTO: 0 10E3/UL
NRBC BLD AUTO-RTO: 0 /100
PLATELET # BLD AUTO: 179 10E3/UL (ref 150–450)
RBC # BLD AUTO: 3.2 10E6/UL (ref 3.8–5.2)
WBC # BLD AUTO: 9.9 10E3/UL (ref 4–11)

## 2025-05-02 PROCEDURE — 87040 BLOOD CULTURE FOR BACTERIA: CPT | Performed by: STUDENT IN AN ORGANIZED HEALTH CARE EDUCATION/TRAINING PROGRAM

## 2025-05-02 PROCEDURE — 85730 THROMBOPLASTIN TIME PARTIAL: CPT | Performed by: STUDENT IN AN ORGANIZED HEALTH CARE EDUCATION/TRAINING PROGRAM

## 2025-05-02 PROCEDURE — 99285 EMERGENCY DEPT VISIT HI MDM: CPT | Mod: 25

## 2025-05-02 PROCEDURE — 84155 ASSAY OF PROTEIN SERUM: CPT | Performed by: STUDENT IN AN ORGANIZED HEALTH CARE EDUCATION/TRAINING PROGRAM

## 2025-05-02 PROCEDURE — 85004 AUTOMATED DIFF WBC COUNT: CPT | Performed by: STUDENT IN AN ORGANIZED HEALTH CARE EDUCATION/TRAINING PROGRAM

## 2025-05-02 PROCEDURE — 83605 ASSAY OF LACTIC ACID: CPT | Performed by: STUDENT IN AN ORGANIZED HEALTH CARE EDUCATION/TRAINING PROGRAM

## 2025-05-02 PROCEDURE — 80048 BASIC METABOLIC PNL TOTAL CA: CPT | Performed by: STUDENT IN AN ORGANIZED HEALTH CARE EDUCATION/TRAINING PROGRAM

## 2025-05-02 PROCEDURE — 250N000011 HC RX IP 250 OP 636: Mod: JZ | Performed by: STUDENT IN AN ORGANIZED HEALTH CARE EDUCATION/TRAINING PROGRAM

## 2025-05-02 PROCEDURE — 85610 PROTHROMBIN TIME: CPT | Performed by: STUDENT IN AN ORGANIZED HEALTH CARE EDUCATION/TRAINING PROGRAM

## 2025-05-02 PROCEDURE — 36415 COLL VENOUS BLD VENIPUNCTURE: CPT | Performed by: STUDENT IN AN ORGANIZED HEALTH CARE EDUCATION/TRAINING PROGRAM

## 2025-05-02 PROCEDURE — 83690 ASSAY OF LIPASE: CPT | Performed by: STUDENT IN AN ORGANIZED HEALTH CARE EDUCATION/TRAINING PROGRAM

## 2025-05-02 PROCEDURE — 258N000003 HC RX IP 258 OP 636: Performed by: STUDENT IN AN ORGANIZED HEALTH CARE EDUCATION/TRAINING PROGRAM

## 2025-05-02 RX ORDER — MORPHINE SULFATE 2 MG/ML
2 INJECTION, SOLUTION INTRAMUSCULAR; INTRAVENOUS ONCE
Status: COMPLETED | OUTPATIENT
Start: 2025-05-02 | End: 2025-05-02

## 2025-05-02 RX ADMIN — SODIUM CHLORIDE 1000 ML: 0.9 INJECTION, SOLUTION INTRAVENOUS at 23:45

## 2025-05-02 NOTE — Clinical Note
Neeraj Cid was seen and treated in our emergency department on 5/2/2025.  She may return to work on 05/10/2025.       If you have any questions or concerns, please don't hesitate to call.      Ruslan Bentley MD

## 2025-05-03 ENCOUNTER — APPOINTMENT (OUTPATIENT)
Dept: CT IMAGING | Facility: HOSPITAL | Age: 35
End: 2025-05-03
Attending: STUDENT IN AN ORGANIZED HEALTH CARE EDUCATION/TRAINING PROGRAM
Payer: COMMERCIAL

## 2025-05-03 VITALS
OXYGEN SATURATION: 97 % | BODY MASS INDEX: 21.26 KG/M2 | RESPIRATION RATE: 16 BRPM | SYSTOLIC BLOOD PRESSURE: 111 MMHG | HEIGHT: 63 IN | TEMPERATURE: 99.4 F | DIASTOLIC BLOOD PRESSURE: 67 MMHG | HEART RATE: 97 BPM | WEIGHT: 120 LBS

## 2025-05-03 LAB
ALBUMIN SERPL BCG-MCNC: 3.9 G/DL (ref 3.5–5.2)
ALBUMIN UR-MCNC: NEGATIVE MG/DL
ALP SERPL-CCNC: 48 U/L (ref 40–150)
ALT SERPL W P-5'-P-CCNC: 17 U/L (ref 0–50)
ANION GAP SERPL CALCULATED.3IONS-SCNC: 8 MMOL/L (ref 7–15)
APPEARANCE UR: CLEAR
APTT PPP: 27 SECONDS (ref 22–38)
AST SERPL W P-5'-P-CCNC: 13 U/L (ref 0–45)
BACTERIA #/AREA URNS HPF: ABNORMAL /HPF
BILIRUB DIRECT SERPL-MCNC: 0.2 MG/DL (ref 0–0.3)
BILIRUB SERPL-MCNC: 0.7 MG/DL
BILIRUB UR QL STRIP: NEGATIVE
BUN SERPL-MCNC: 9.2 MG/DL (ref 6–20)
CALCIUM SERPL-MCNC: 8.3 MG/DL (ref 8.8–10.4)
CHLORIDE SERPL-SCNC: 100 MMOL/L (ref 98–107)
COLOR UR AUTO: COLORLESS
CREAT SERPL-MCNC: 0.5 MG/DL (ref 0.51–0.95)
EGFRCR SERPLBLD CKD-EPI 2021: >90 ML/MIN/1.73M2
GLUCOSE SERPL-MCNC: 108 MG/DL (ref 70–99)
GLUCOSE UR STRIP-MCNC: NEGATIVE MG/DL
HCO3 SERPL-SCNC: 28 MMOL/L (ref 22–29)
HGB UR QL STRIP: ABNORMAL
INR PPP: 1.04 (ref 0.85–1.15)
KETONES UR STRIP-MCNC: 20 MG/DL
LACTATE SERPL-SCNC: 0.9 MMOL/L (ref 0.7–2)
LEUKOCYTE ESTERASE UR QL STRIP: ABNORMAL
LIPASE SERPL-CCNC: 17 U/L (ref 13–60)
MUCOUS THREADS #/AREA URNS LPF: PRESENT /LPF
NITRATE UR QL: NEGATIVE
PH UR STRIP: 7.5 [PH] (ref 5–7)
POTASSIUM SERPL-SCNC: 3.4 MMOL/L (ref 3.4–5.3)
PROT SERPL-MCNC: 6.7 G/DL (ref 6.4–8.3)
PROTHROMBIN TIME: 13.8 SECONDS (ref 11.8–14.8)
RBC URINE: 8 /HPF
SODIUM SERPL-SCNC: 136 MMOL/L (ref 135–145)
SP GR UR STRIP: 1.01 (ref 1–1.03)
SQUAMOUS EPITHELIAL: 3 /HPF
UROBILINOGEN UR STRIP-MCNC: NORMAL MG/DL
WBC CLUMPS #/AREA URNS HPF: PRESENT /HPF
WBC URINE: 14 /HPF

## 2025-05-03 PROCEDURE — 96360 HYDRATION IV INFUSION INIT: CPT | Mod: 59

## 2025-05-03 PROCEDURE — 250N000011 HC RX IP 250 OP 636: Performed by: STUDENT IN AN ORGANIZED HEALTH CARE EDUCATION/TRAINING PROGRAM

## 2025-05-03 PROCEDURE — 81001 URINALYSIS AUTO W/SCOPE: CPT | Performed by: STUDENT IN AN ORGANIZED HEALTH CARE EDUCATION/TRAINING PROGRAM

## 2025-05-03 PROCEDURE — 87088 URINE BACTERIA CULTURE: CPT | Performed by: STUDENT IN AN ORGANIZED HEALTH CARE EDUCATION/TRAINING PROGRAM

## 2025-05-03 PROCEDURE — 87040 BLOOD CULTURE FOR BACTERIA: CPT | Performed by: STUDENT IN AN ORGANIZED HEALTH CARE EDUCATION/TRAINING PROGRAM

## 2025-05-03 PROCEDURE — 36415 COLL VENOUS BLD VENIPUNCTURE: CPT | Performed by: STUDENT IN AN ORGANIZED HEALTH CARE EDUCATION/TRAINING PROGRAM

## 2025-05-03 PROCEDURE — 74177 CT ABD & PELVIS W/CONTRAST: CPT

## 2025-05-03 RX ORDER — IOPAMIDOL 755 MG/ML
65 INJECTION, SOLUTION INTRAVASCULAR ONCE
Status: COMPLETED | OUTPATIENT
Start: 2025-05-03 | End: 2025-05-03

## 2025-05-03 RX ADMIN — IOPAMIDOL 65 ML: 755 INJECTION, SOLUTION INTRAVENOUS at 01:18

## 2025-05-03 ASSESSMENT — ACTIVITIES OF DAILY LIVING (ADL)
ADLS_ACUITY_SCORE: 41
ADLS_ACUITY_SCORE: 41

## 2025-05-03 NOTE — DISCHARGE INSTRUCTIONS
Your workup was reassuring, and after discussion with our on-call OB/GYN, your symptoms can be expected in the postoperative period.  Continue to treat your symptoms using over-the-counter pain control as well as your prescribed oxycodone.    Please return to the ER if your symptoms worsen.

## 2025-05-03 NOTE — ED PROVIDER NOTES
EMERGENCY DEPARTMENT ENCOUNTER      NAME: Neeraj Cid  AGE: 35 year old female  YOB: 1990  MRN: [id number]  EVALUATION DATE & TIME: 5/2/2025 11:22 PM    PCP: Bernard العراقي    ED PROVIDER: Ruslan Bentley MD      Chief Complaint   Patient presents with    Abdominal Pain         FINAL IMPRESSION:  1. Generalized abdominal pain          ED COURSE & MEDICAL DECISION MAKING:    Pertinent Labs & Imaging studies reviewed. (See chart for details)  35 year old female presents to the Emergency Department for evaluation of abd pain    ED Course as of 05/03/25 0224   Fri May 02, 2025   2339 Patient is a 35-year-old female who had a right hemorrhagic cyst removal and hemoperitoneum evacuation on 4/30, and had a resurgence of the same pain that she presented with today around 6 PM, suddenly.  She is 99.4 temporal temperature, but feels warm, and suspect true fever.  She has diffuse abdominal tenderness, but predominantly on the right side.  Incision sites appear clean, dry, intact without erythema, purulence, dehiscence.  No rigidity or guarding.  She has low blood pressures at 90s over 50s without tachycardia.  She has been having some vaginal bleeding, but not soaking through 1 pad per hour.  She has not had a bowel movement since before surgery.  Differential diagnosis includes continued intra-abdominal bleeding, postoperative infection, SBO, constipation, UTI, sepsis.   Sat May 03, 2025   0050 No transaminitis or pancreatitis.  No electrolyte abnormalities or kidney injury.  Coags normal.  Lactic acid normal at 0.9.  No leukocytosis.  Hemoglobin is 9.5, 1 point down from discharge   0124 Urinalysis shows few bacteria with leukocyte esterase, no nitrites, and squamous cells are present.  This is not indicative of infection.  There is gross blood, which is likely from vaginal bleeding.   0151 CT scan shows a decreasing amount of hemorrhage in the abdomen, there is air and fluid around the right adnexa, which the  radiologist favors to be post cystectomy changes, especially in the setting of no white count or lactic acid elevation.   0201 Discussed with OB/GYN, who is reassured by the workup, history, exam discussed.  Felt that sometimes with the surgical material they used, this can precipitate low-grade fever and pain.  Will reassess and control pain in the meantime, as she otherwise would not meet admission.     2:14 AM I re-evaluated the patient and discussed the results of labs and imaging.     Medical Decision Making  I reviewed the EMR: Inpatient Record: recent hospitalization and surgery  Care impacted by recent cystectomy  I independently interpreted the CT abd/pelvis and note improvement from prior hemoperitoneum, and there is air at the surgical bed, likely post-operative changes in the absence of white count or lactic acid elevation. See radiology report for final interpretation.  I discussed the care with another health care provider: radiology and OBGYN  Discharge. No recommendations on prescription strength medication(s). See documentation for any additional details.    MIPS (CTPE, Dental pain, Taylor, Sinusitis, Asthma/COPD, Head Trauma): Not Applicable    SEPSIS: None            At the conclusion of the encounter I discussed the results of all of the tests and the disposition. The questions were answered. The patient or family acknowledged understanding and was agreeable with the care plan.     0 minutes of critical care time     MEDICATIONS GIVEN IN THE EMERGENCY:  Medications   sodium chloride 0.9% BOLUS 1,000 mL (0 mLs Intravenous Stopped 5/3/25 0054)   morphine (PF) injection 2 mg (2 mg Intravenous Not Given 5/2/25 2614)   iopamidol (ISOVUE-370) solution 65 mL (65 mLs Intravenous $Given 5/3/25 0118)       NEW PRESCRIPTIONS STARTED AT TODAY'S ER VISIT  New Prescriptions    No medications on file          =================================================================    HPI    Patient information was  obtained from: patient, significant other    Use of : Yes (In Person) - Language Jefferson Cid is a 35 year old female with who presents to this ED for evaluation of abdominal pain.  Patient was seen on  for generalized abdominal pain, found to have a hemorrhagic cyst on the right side which was removed in the OR by laparoscopy, and had moderate hemoperitoneum evacuated.  Since discharge she has been feeling better, but today around 6 PM had sudden onset of pain in the same distribution and character that she had when she initially presented.  Pain is in the bilateral lower abdomen, radiating up the sides.  She also feels lightheaded.  She denies dysuria, hematuria, but has been having some vaginal bleeding since surgery.  She is not soaking through 1 pad per hour.  She has not had a bowel movement since surgery, but has been passing gas.  No nausea or vomiting.      PAST MEDICAL HISTORY:  Past Medical History:   Diagnosis Date    Dermoid cyst 2017    surgically removed     (normal spontaneous vaginal delivery)     Positive H. pylori test 2017    Skin tag of perianal region 2014    From faulty second degree midline laceration repair after last delivery. Repaired .    Urinary tract infection        PAST SURGICAL HISTORY:  Past Surgical History:   Procedure Laterality Date    LAPAROSCOPIC CYSTECTOMY OVARIAN (ONCOLOGY)  2017    dermoid cyst removal    LAPAROSCOPIC CYSTECTOMY OVARIAN (ONCOLOGY) Right 2025    Procedure: right ovarian cystectomy, evacuation of hemoperitoneum;  Surgeon: Zoe Cline MD;  Location: [facility] OR    LAPAROSCOPY DIAGNOSTIC (GYN) N/A 2025    Procedure: laparoscopy, evacuation of hemoperitoneum;  Surgeon: Zoe Cline MD;  Location: [facility] OR           CURRENT MEDICATIONS:    acetaminophen (TYLENOL) 325 MG tablet  ibuprofen (ADVIL/MOTRIN) 600 MG tablet  oxyCODONE (ROXICODONE) 5 MG  "tablet        ALLERGIES:  No Known Allergies    FAMILY HISTORY:  Family History   Problem Relation Age of Onset    No Known Problems Mother     No Known Problems Father     No Known Problems Sister     No Known Problems Brother     No Known Problems Brother        SOCIAL HISTORY:   Social History     Socioeconomic History    Marital status:    Tobacco Use    Smoking status: Never    Smokeless tobacco: Never   Substance and Sexual Activity    Alcohol use: Never    Drug use: Never    Sexual activity: Yes     Partners: Male   Social History Narrative    Lives with  (Sheila), in-laws and 2 children       VITALS:  /67   Pulse 97   Temp 99.4  F (37.4  C) (Temporal)   Resp 16   Ht 1.6 m (5' 3\")   Wt 54.4 kg (120 lb)   LMP 03/29/2025   SpO2 97%   BMI 21.26 kg/m      PHYSICAL EXAM    Physical Exam  Vitals and nursing note reviewed.   Constitutional:       General: She is not in acute distress.     Appearance: Normal appearance. She is normal weight. She is not ill-appearing.   HENT:      Head: Normocephalic and atraumatic.      Nose: Nose normal.      Mouth/Throat:      Mouth: Mucous membranes are dry.      Pharynx: Oropharynx is clear.   Eyes:      Extraocular Movements: Extraocular movements intact.      Conjunctiva/sclera: Conjunctivae normal.   Cardiovascular:      Rate and Rhythm: Normal rate and regular rhythm.      Pulses: Normal pulses.      Heart sounds: Normal heart sounds. No murmur heard.  Pulmonary:      Effort: Pulmonary effort is normal. No respiratory distress.      Breath sounds: Normal breath sounds.   Abdominal:      General: Abdomen is flat. There is no distension.      Palpations: Abdomen is soft.      Tenderness: There is abdominal tenderness (generalized). There is no right CVA tenderness, left CVA tenderness, guarding or rebound.   Musculoskeletal:         General: Normal range of motion.      Cervical back: Normal range of motion.      Right lower leg: No edema.      Left " lower leg: No edema.   Skin:     General: Skin is warm and dry.      Capillary Refill: Capillary refill takes less than 2 seconds.      Coloration: Skin is pale.   Neurological:      General: No focal deficit present.      Mental Status: She is alert and oriented to person, place, and time. Mental status is at baseline.   Psychiatric:         Mood and Affect: Mood normal.         Behavior: Behavior normal.         Thought Content: Thought content normal.         Judgment: Judgment normal.            LAB:  Labs Ordered and Resulted from Time of ED Arrival to Time of ED Departure   BASIC METABOLIC PANEL - Abnormal       Result Value    Sodium 136      Potassium 3.4      Chloride 100      Carbon Dioxide (CO2) 28      Anion Gap 8      Urea Nitrogen 9.2      Creatinine 0.50 (*)     GFR Estimate >90      Calcium 8.3 (*)     Glucose 108 (*)    ROUTINE UA WITH MICROSCOPIC REFLEX TO CULTURE - Abnormal    Color Urine Colorless      Appearance Urine Clear      Glucose Urine Negative      Bilirubin Urine Negative      Ketones Urine 20 (*)     Specific Gravity Urine 1.013      Blood Urine >1.0 mg/dL (*)     pH Urine 7.5 (*)     Protein Albumin Urine Negative      Urobilinogen Urine Normal      Nitrite Urine Negative      Leukocyte Esterase Urine 75 Jeni/uL (*)     Bacteria Urine Few (*)     WBC Clumps Urine Present (*)     Mucus Urine Present (*)     RBC Urine 8 (*)     WBC Urine 14 (*)     Squamous Epithelials Urine 3 (*)    CBC WITH PLATELETS AND DIFFERENTIAL - Abnormal    WBC Count 9.9      RBC Count 3.20 (*)     Hemoglobin 9.5 (*)     Hematocrit 27.9 (*)     MCV 87      MCH 29.7      MCHC 34.1      RDW 12.4      Platelet Count 179      % Neutrophils 78      % Lymphocytes 16      % Monocytes 5      % Eosinophils 1      % Basophils 0      % Immature Granulocytes 0      NRBCs per 100 WBC 0      Absolute Neutrophils 7.8      Absolute Lymphocytes 1.6      Absolute Monocytes 0.5      Absolute Eosinophils 0.1      Absolute Basophils  0.0      Absolute Immature Granulocytes 0.0      Absolute NRBCs 0.0     HEPATIC FUNCTION PANEL - Normal    Protein Total 6.7      Albumin 3.9      Bilirubin Total 0.7      Alkaline Phosphatase 48      AST 13      ALT 17      Bilirubin Direct 0.20     LIPASE - Normal    Lipase 17     LACTIC ACID WHOLE BLOOD WITH 1X REPEAT IN 2 HR WHEN >2 - Normal    Lactic Acid, Initial 0.9     INR - Normal    INR 1.04      PT 13.8     PARTIAL THROMBOPLASTIN TIME - Normal    aPTT 27     BLOOD CULTURE   BLOOD CULTURE   URINE CULTURE      Results for orders placed or performed during the hospital encounter of 05/02/25   CT Abdomen Pelvis w Contrast    Impression    IMPRESSION:   1.  Decreasing hemorrhage in the abdomen and pelvis since prior examination.  2.  Fluid and gas in the right adnexa in the region of prior right ovarian cyst. Findings likely represent cystectomy changes. If patient has elevated white count early phlegmon or abscess not excluded.  3.  Tiny periumbilical ventral hernia containing fat and a bubble of gas from recent surgery.     Basic metabolic panel   Result Value Ref Range    Sodium 136 135 - 145 mmol/L    Potassium 3.4 3.4 - 5.3 mmol/L    Chloride 100 98 - 107 mmol/L    Carbon Dioxide (CO2) 28 22 - 29 mmol/L    Anion Gap 8 7 - 15 mmol/L    Urea Nitrogen 9.2 6.0 - 20.0 mg/dL    Creatinine 0.50 (L) 0.51 - 0.95 mg/dL    GFR Estimate >90 >60 mL/min/1.73m2    Calcium 8.3 (L) 8.8 - 10.4 mg/dL    Glucose 108 (H) 70 - 99 mg/dL   Hepatic function panel   Result Value Ref Range    Protein Total 6.7 6.4 - 8.3 g/dL    Albumin 3.9 3.5 - 5.2 g/dL    Bilirubin Total 0.7 <=1.2 mg/dL    Alkaline Phosphatase 48 40 - 150 U/L    AST 13 0 - 45 U/L    ALT 17 0 - 50 U/L    Bilirubin Direct 0.20 0.00 - 0.30 mg/dL   Result Value Ref Range    Lipase 17 13 - 60 U/L   UA with Microscopic reflex to Culture    Specimen: Urine, Clean Catch   Result Value Ref Range    Color Urine Colorless Colorless, Straw, Light Yellow, Yellow    Appearance  Urine Clear Clear    Glucose Urine Negative Negative mg/dL    Bilirubin Urine Negative Negative    Ketones Urine 20 (A) Negative mg/dL    Specific Gravity Urine 1.013 1.001 - 1.030    Blood Urine >1.0 mg/dL (A) Negative    pH Urine 7.5 (H) 5.0 - 7.0    Protein Albumin Urine Negative Negative mg/dL    Urobilinogen Urine Normal Normal mg/dL    Nitrite Urine Negative Negative    Leukocyte Esterase Urine 75 Jeni/uL (A) Negative    Bacteria Urine Few (A) None Seen /HPF    WBC Clumps Urine Present (A) None Seen /HPF    Mucus Urine Present (A) None Seen /LPF    RBC Urine 8 (H) <=2 /HPF    WBC Urine 14 (H) <=5 /HPF    Squamous Epithelials Urine 3 (H) <=1 /HPF   Lactic acid whole blood with 1x repeat in 2 hr when >2   Result Value Ref Range    Lactic Acid, Initial 0.9 0.7 - 2.0 mmol/L   INR   Result Value Ref Range    INR 1.04 0.85 - 1.15    PT 13.8 11.8 - 14.8 Seconds   Partial thromboplastin time   Result Value Ref Range    aPTT 27 22 - 38 Seconds   CBC with platelets and differential   Result Value Ref Range    WBC Count 9.9 4.0 - 11.0 10e3/uL    RBC Count 3.20 (L) 3.80 - 5.20 10e6/uL    Hemoglobin 9.5 (L) 11.7 - 15.7 g/dL    Hematocrit 27.9 (L) 35.0 - 47.0 %    MCV 87 78 - 100 fL    MCH 29.7 26.5 - 33.0 pg    MCHC 34.1 31.5 - 36.5 g/dL    RDW 12.4 10.0 - 15.0 %    Platelet Count 179 150 - 450 10e3/uL    % Neutrophils 78 %    % Lymphocytes 16 %    % Monocytes 5 %    % Eosinophils 1 %    % Basophils 0 %    % Immature Granulocytes 0 %    NRBCs per 100 WBC 0 <1 /100    Absolute Neutrophils 7.8 1.6 - 8.3 10e3/uL    Absolute Lymphocytes 1.6 0.8 - 5.3 10e3/uL    Absolute Monocytes 0.5 0.0 - 1.3 10e3/uL    Absolute Eosinophils 0.1 0.0 - 0.7 10e3/uL    Absolute Basophils 0.0 0.0 - 0.2 10e3/uL    Absolute Immature Granulocytes 0.0 <=0.4 10e3/uL    Absolute NRBCs 0.0 10e3/uL       RADIOLOGY:  Reviewed all pertinent imaging. Please see official radiology report.  CT Abdomen Pelvis w Contrast   Final Result   IMPRESSION:    1.   Decreasing hemorrhage in the abdomen and pelvis since prior examination.   2.  Fluid and gas in the right adnexa in the region of prior right ovarian cyst. Findings likely represent cystectomy changes. If patient has elevated white count early phlegmon or abscess not excluded.   3.  Tiny periumbilical ventral hernia containing fat and a bubble of gas from recent surgery.           See radiology dictation in full report.     PROCEDURES:   None      [facility] Documentation:   CMS Diagnoses: None          I, Bridget Hopkins, am serving as a scribe to document services personally performed by Ruslan Bentley MD , based on my observations and the provider's statements to me. I, Ruslan Bentley MD, attest that Bridget Hopkins is acting in a scribe capacity, has observed my performance of the services and has documented them in accordance with my direction.       Ruslan Bentley MD  [facility] EMERGENCY DEPARTMENT  [address] [place] [id number]  [phone]       Ruslan Bentley MD  05/03/25 0228

## 2025-05-03 NOTE — ED TRIAGE NOTES
Patient arrives from home. Reports she had surgery on 04/30/25 on ovarian cyst. Patient started having generalized abdominal pain that started this evening and is now 10/10. Had taken oxycodone at 2100. Last bowel movement was 04/29/25.     Triage Assessment (Adult)       Row Name 05/02/25 2319          Triage Assessment    Airway WDL WDL        Respiratory WDL    Respiratory WDL WDL        Cardiac WDL    Cardiac WDL WDL

## 2025-05-04 LAB
BACTERIA UR CULT: ABNORMAL
BACTERIA UR CULT: ABNORMAL
PATH REPORT.COMMENTS IMP SPEC: NORMAL
PATH REPORT.COMMENTS IMP SPEC: NORMAL
PATH REPORT.FINAL DX SPEC: NORMAL
PATH REPORT.GROSS SPEC: NORMAL
PATH REPORT.MICROSCOPIC SPEC OTHER STN: NORMAL
PATH REPORT.RELEVANT HX SPEC: NORMAL
PHOTO IMAGE: NORMAL

## 2025-05-08 LAB
BACTERIA BLD CULT: NO GROWTH
BACTERIA BLD CULT: NO GROWTH

## (undated) DEVICE — NDL INSUFFLATION 13GA 120MM C2201

## (undated) DEVICE — TUBING SMOKE EVAC PNEUMOCLEAR HIGH FLOW 0620050250

## (undated) DEVICE — GLOVE PI ULTRATCH M LF SZ 6.5 PF CUFF TEXT STRL LF 42665

## (undated) DEVICE — PREP POVIDONE-IODINE 10% SOLUTION 4OZ BOTTLE MDS093944

## (undated) DEVICE — SURGICEL ENDOSCOPIC APPLICATOR FOR ORC POWDER 3123SPEA

## (undated) DEVICE — SOL RINGERS LACTATED 1000ML BAG 2B2324X

## (undated) DEVICE — SYRINGE 10ML FILL SALINE FLUSH STERILE 306553

## (undated) DEVICE — ESU LIGASURE LAPAROSCOPIC BLUNT TIP SEALER 5MMX37CM LF1837

## (undated) DEVICE — MAT FLOOR WATERPROOF BACKSHEET FMBP30

## (undated) DEVICE — Device

## (undated) DEVICE — GLOVE BIOGEL PI ULTRATOUCH G SZ 6.5 42165

## (undated) DEVICE — ENDO TROCAR FIRST ENTRY KII FIOS Z-THRD 05X100MM CTF03

## (undated) DEVICE — PREP CHLORAPREP 26ML TINTED HI-LITE ORANGE 930815

## (undated) DEVICE — PREP POVIDONE-IODINE 7.5% SCRUB 4OZ BOTTLE MDS093945

## (undated) DEVICE — SUCTION MANIFOLD NEPTUNE 2 SYS 1 PORT 702-025-000

## (undated) DEVICE — SOL WATER IRRIG 1000ML BOTTLE 2F7114

## (undated) DEVICE — GLOVE BIOGEL PI SZ 6.5 40865

## (undated) DEVICE — ENDO TROCAR SLEEVE KII Z-THREADED 05X100MM CTS02

## (undated) DEVICE — GLOVE UNDER INDICATOR PI SZ 7.0 LF 41670

## (undated) DEVICE — DRAPE POUCH INSTRUMENT 3 POCKET 1018L

## (undated) DEVICE — PROTECTOR ARM STANDARD ONE STEP 40433 (COI)

## (undated) DEVICE — GLOVE BIOGEL PI ULTRATOUCH G SZ 6.0 42160

## (undated) DEVICE — CUSTOM PACK PELVISCOPY SMA5BPVHEA

## (undated) DEVICE — SURGICEL POWDER ABSORBABLE HEMOSTAT 3GM 3013SP

## (undated) DEVICE — GOWN LG DISP 9515

## (undated) DEVICE — GLOVE UNDER INDICATOR PI SZ 6.5 LF 41665

## (undated) DEVICE — SU MONOCRYL+ 4-0 18IN PS2 UND MCP496G

## (undated) DEVICE — SUCTION STRYKERFLOW II 250-070-500

## (undated) DEVICE — ENDO SHEARS RENEW LAP ENDOCUT SCISSOR TIP 16.5MM 3142

## (undated) DEVICE — GOWN IMPERVIOUS BREATHABLE SMART LG 89015

## (undated) RX ORDER — BUPIVACAINE HYDROCHLORIDE 2.5 MG/ML
INJECTION, SOLUTION EPIDURAL; INFILTRATION; INTRACAUDAL; PERINEURAL
Status: DISPENSED
Start: 2025-04-30

## (undated) RX ORDER — LIDOCAINE HYDROCHLORIDE 10 MG/ML
INJECTION, SOLUTION EPIDURAL; INFILTRATION; INTRACAUDAL; PERINEURAL
Status: DISPENSED
Start: 2025-04-30

## (undated) RX ORDER — FENTANYL CITRATE 50 UG/ML
INJECTION, SOLUTION INTRAMUSCULAR; INTRAVENOUS
Status: DISPENSED
Start: 2025-04-30